# Patient Record
Sex: MALE | Race: OTHER | HISPANIC OR LATINO | URBAN - METROPOLITAN AREA
[De-identification: names, ages, dates, MRNs, and addresses within clinical notes are randomized per-mention and may not be internally consistent; named-entity substitution may affect disease eponyms.]

---

## 2024-02-24 ENCOUNTER — APPOINTMENT (OUTPATIENT)
Dept: RADIOLOGY | Facility: MEDICAL CENTER | Age: 49
DRG: 552 | End: 2024-02-24
Attending: EMERGENCY MEDICINE
Payer: COMMERCIAL

## 2024-02-24 ENCOUNTER — HOSPITAL ENCOUNTER (OUTPATIENT)
Dept: RADIOLOGY | Facility: MEDICAL CENTER | Age: 49
End: 2024-02-24
Attending: EMERGENCY MEDICINE

## 2024-02-24 ENCOUNTER — HOSPITAL ENCOUNTER (INPATIENT)
Facility: MEDICAL CENTER | Age: 49
LOS: 3 days | DRG: 552 | End: 2024-02-27
Attending: EMERGENCY MEDICINE | Admitting: SURGERY
Payer: COMMERCIAL

## 2024-02-24 DIAGNOSIS — S22.081A BURST FRACTURE OF T12 VERTEBRA (HCC): ICD-10-CM

## 2024-02-24 DIAGNOSIS — S22.081A BURST FRACTURE OF TWELFTH THORACIC VERTEBRA (HCC): ICD-10-CM

## 2024-02-24 PROBLEM — Z53.09 CONTRAINDICATION TO DEEP VEIN THROMBOSIS (DVT) PROPHYLAXIS: Status: ACTIVE | Noted: 2024-02-24

## 2024-02-24 PROBLEM — E78.5 DYSLIPIDEMIA: Status: ACTIVE | Noted: 2024-02-24

## 2024-02-24 PROBLEM — T14.90XA TRAUMA: Status: ACTIVE | Noted: 2024-02-24

## 2024-02-24 PROBLEM — I10 PRIMARY HYPERTENSION: Status: ACTIVE | Noted: 2024-02-24

## 2024-02-24 PROBLEM — E11.9 TYPE 2 DIABETES MELLITUS (HCC): Status: ACTIVE | Noted: 2024-02-24

## 2024-02-24 LAB
ABO + RH BLD: NORMAL
ABO GROUP BLD: NORMAL
ALBUMIN SERPL BCP-MCNC: 4.6 G/DL (ref 3.2–4.9)
ALBUMIN/GLOB SERPL: 1.6 G/DL
ALP SERPL-CCNC: 78 U/L (ref 30–99)
ALT SERPL-CCNC: 29 U/L (ref 2–50)
ANION GAP SERPL CALC-SCNC: 15 MMOL/L (ref 7–16)
APTT PPP: 26.9 SEC (ref 24.7–36)
AST SERPL-CCNC: 29 U/L (ref 12–45)
BILIRUB SERPL-MCNC: 0.5 MG/DL (ref 0.1–1.5)
BLD GP AB SCN SERPL QL: NORMAL
BUN SERPL-MCNC: 15 MG/DL (ref 8–22)
CALCIUM ALBUM COR SERPL-MCNC: 9.1 MG/DL (ref 8.5–10.5)
CALCIUM SERPL-MCNC: 9.6 MG/DL (ref 8.5–10.5)
CHLORIDE SERPL-SCNC: 103 MMOL/L (ref 96–112)
CO2 SERPL-SCNC: 22 MMOL/L (ref 20–33)
CREAT SERPL-MCNC: 0.83 MG/DL (ref 0.5–1.4)
ERYTHROCYTE [DISTWIDTH] IN BLOOD BY AUTOMATED COUNT: 38.4 FL (ref 35.9–50)
ETHANOL BLD-MCNC: <10.1 MG/DL
GFR SERPLBLD CREATININE-BSD FMLA CKD-EPI: 108 ML/MIN/1.73 M 2
GLOBULIN SER CALC-MCNC: 2.8 G/DL (ref 1.9–3.5)
GLUCOSE SERPL-MCNC: 160 MG/DL (ref 65–99)
HCT VFR BLD AUTO: 46.3 % (ref 42–52)
HGB BLD-MCNC: 16.1 G/DL (ref 14–18)
INR PPP: 1.05 (ref 0.87–1.13)
MCH RBC QN AUTO: 29 PG (ref 27–33)
MCHC RBC AUTO-ENTMCNC: 34.8 G/DL (ref 32.3–36.5)
MCV RBC AUTO: 83.3 FL (ref 81.4–97.8)
PLATELET # BLD AUTO: 261 K/UL (ref 164–446)
PMV BLD AUTO: 9.8 FL (ref 9–12.9)
POTASSIUM SERPL-SCNC: 3.8 MMOL/L (ref 3.6–5.5)
PROT SERPL-MCNC: 7.4 G/DL (ref 6–8.2)
PROTHROMBIN TIME: 13.8 SEC (ref 12–14.6)
RBC # BLD AUTO: 5.56 M/UL (ref 4.7–6.1)
RH BLD: NORMAL
SODIUM SERPL-SCNC: 140 MMOL/L (ref 135–145)
WBC # BLD AUTO: 20.8 K/UL (ref 4.8–10.8)

## 2024-02-24 PROCEDURE — 85027 COMPLETE CBC AUTOMATED: CPT

## 2024-02-24 PROCEDURE — 36415 COLL VENOUS BLD VENIPUNCTURE: CPT

## 2024-02-24 PROCEDURE — 72128 CT CHEST SPINE W/O DYE: CPT

## 2024-02-24 PROCEDURE — 85730 THROMBOPLASTIN TIME PARTIAL: CPT

## 2024-02-24 PROCEDURE — 86850 RBC ANTIBODY SCREEN: CPT

## 2024-02-24 PROCEDURE — 82077 ASSAY SPEC XCP UR&BREATH IA: CPT

## 2024-02-24 PROCEDURE — 99285 EMERGENCY DEPT VISIT HI MDM: CPT

## 2024-02-24 PROCEDURE — 305948 HCHG GREEN TRAUMA ACT PRE-NOTIFY NO CC

## 2024-02-24 PROCEDURE — 85610 PROTHROMBIN TIME: CPT

## 2024-02-24 PROCEDURE — 770001 HCHG ROOM/CARE - MED/SURG/GYN PRIV*

## 2024-02-24 PROCEDURE — 86900 BLOOD TYPING SEROLOGIC ABO: CPT

## 2024-02-24 PROCEDURE — 99222 1ST HOSP IP/OBS MODERATE 55: CPT | Performed by: SURGERY

## 2024-02-24 PROCEDURE — 80053 COMPREHEN METABOLIC PANEL: CPT

## 2024-02-24 PROCEDURE — 86901 BLOOD TYPING SEROLOGIC RH(D): CPT

## 2024-02-24 RX ORDER — OXYCODONE HYDROCHLORIDE 5 MG/1
5 TABLET ORAL
Status: DISCONTINUED | OUTPATIENT
Start: 2024-02-24 | End: 2024-02-27 | Stop reason: HOSPADM

## 2024-02-24 RX ORDER — AMOXICILLIN 250 MG
1 CAPSULE ORAL
Status: DISCONTINUED | OUTPATIENT
Start: 2024-02-24 | End: 2024-02-27 | Stop reason: HOSPADM

## 2024-02-24 RX ORDER — ACETAMINOPHEN 325 MG/1
650 TABLET ORAL EVERY 6 HOURS
Status: DISCONTINUED | OUTPATIENT
Start: 2024-02-25 | End: 2024-02-27 | Stop reason: HOSPADM

## 2024-02-24 RX ORDER — OXYCODONE HYDROCHLORIDE 10 MG/1
10 TABLET ORAL
Status: DISCONTINUED | OUTPATIENT
Start: 2024-02-24 | End: 2024-02-27 | Stop reason: HOSPADM

## 2024-02-24 RX ORDER — HYDROMORPHONE HYDROCHLORIDE 1 MG/ML
0.5 INJECTION, SOLUTION INTRAMUSCULAR; INTRAVENOUS; SUBCUTANEOUS
Status: DISCONTINUED | OUTPATIENT
Start: 2024-02-24 | End: 2024-02-27 | Stop reason: HOSPADM

## 2024-02-24 RX ORDER — ENEMA 19; 7 G/133ML; G/133ML
1 ENEMA RECTAL
Status: COMPLETED | OUTPATIENT
Start: 2024-02-24 | End: 2024-02-26

## 2024-02-24 RX ORDER — SODIUM CHLORIDE, SODIUM LACTATE, POTASSIUM CHLORIDE, CALCIUM CHLORIDE 600; 310; 30; 20 MG/100ML; MG/100ML; MG/100ML; MG/100ML
INJECTION, SOLUTION INTRAVENOUS CONTINUOUS
Status: DISCONTINUED | OUTPATIENT
Start: 2024-02-25 | End: 2024-02-25

## 2024-02-24 RX ORDER — DOCUSATE SODIUM 100 MG/1
100 CAPSULE, LIQUID FILLED ORAL 2 TIMES DAILY
Status: DISCONTINUED | OUTPATIENT
Start: 2024-02-25 | End: 2024-02-27 | Stop reason: HOSPADM

## 2024-02-24 RX ORDER — METAXALONE 800 MG/1
800 TABLET ORAL 3 TIMES DAILY
Status: DISCONTINUED | OUTPATIENT
Start: 2024-02-25 | End: 2024-02-27 | Stop reason: HOSPADM

## 2024-02-24 RX ORDER — ACETAMINOPHEN 325 MG/1
650 TABLET ORAL EVERY 6 HOURS PRN
Status: DISCONTINUED | OUTPATIENT
Start: 2024-03-01 | End: 2024-02-27 | Stop reason: HOSPADM

## 2024-02-24 RX ORDER — ONDANSETRON 4 MG/1
4 TABLET, ORALLY DISINTEGRATING ORAL EVERY 4 HOURS PRN
Status: DISCONTINUED | OUTPATIENT
Start: 2024-02-24 | End: 2024-02-27 | Stop reason: HOSPADM

## 2024-02-24 RX ORDER — BISACODYL 10 MG
10 SUPPOSITORY, RECTAL RECTAL
Status: DISCONTINUED | OUTPATIENT
Start: 2024-02-24 | End: 2024-02-27 | Stop reason: HOSPADM

## 2024-02-24 RX ORDER — POLYETHYLENE GLYCOL 3350 17 G/17G
1 POWDER, FOR SOLUTION ORAL 2 TIMES DAILY
Status: DISCONTINUED | OUTPATIENT
Start: 2024-02-25 | End: 2024-02-27 | Stop reason: HOSPADM

## 2024-02-24 RX ORDER — LABETALOL HYDROCHLORIDE 5 MG/ML
10 INJECTION, SOLUTION INTRAVENOUS EVERY 4 HOURS PRN
Status: DISCONTINUED | OUTPATIENT
Start: 2024-02-24 | End: 2024-02-27 | Stop reason: HOSPADM

## 2024-02-24 RX ORDER — ONDANSETRON 2 MG/ML
4 INJECTION INTRAMUSCULAR; INTRAVENOUS EVERY 4 HOURS PRN
Status: DISCONTINUED | OUTPATIENT
Start: 2024-02-24 | End: 2024-02-27 | Stop reason: HOSPADM

## 2024-02-24 RX ORDER — AMOXICILLIN 250 MG
1 CAPSULE ORAL NIGHTLY
Status: DISCONTINUED | OUTPATIENT
Start: 2024-02-25 | End: 2024-02-27 | Stop reason: HOSPADM

## 2024-02-24 RX ORDER — GABAPENTIN 100 MG/1
100 CAPSULE ORAL EVERY 8 HOURS
Status: DISCONTINUED | OUTPATIENT
Start: 2024-02-25 | End: 2024-02-27 | Stop reason: HOSPADM

## 2024-02-25 ENCOUNTER — APPOINTMENT (OUTPATIENT)
Dept: RADIOLOGY | Facility: MEDICAL CENTER | Age: 49
DRG: 552 | End: 2024-02-25
Attending: EMERGENCY MEDICINE
Payer: COMMERCIAL

## 2024-02-25 ENCOUNTER — APPOINTMENT (OUTPATIENT)
Dept: RADIOLOGY | Facility: MEDICAL CENTER | Age: 49
DRG: 552 | End: 2024-02-25
Attending: NEUROLOGICAL SURGERY
Payer: COMMERCIAL

## 2024-02-25 ENCOUNTER — APPOINTMENT (OUTPATIENT)
Dept: RADIOLOGY | Facility: MEDICAL CENTER | Age: 49
DRG: 552 | End: 2024-02-25
Attending: SURGERY
Payer: COMMERCIAL

## 2024-02-25 PROBLEM — Z78.9 NO CONTRAINDICATION TO DEEP VEIN THROMBOSIS (DVT) PROPHYLAXIS: Status: ACTIVE | Noted: 2024-02-24

## 2024-02-25 LAB
ANION GAP SERPL CALC-SCNC: 12 MMOL/L (ref 7–16)
BASOPHILS # BLD AUTO: 0.2 % (ref 0–1.8)
BASOPHILS # BLD: 0.02 K/UL (ref 0–0.12)
BUN SERPL-MCNC: 19 MG/DL (ref 8–22)
CALCIUM SERPL-MCNC: 9 MG/DL (ref 8.5–10.5)
CHLORIDE SERPL-SCNC: 103 MMOL/L (ref 96–112)
CO2 SERPL-SCNC: 25 MMOL/L (ref 20–33)
CREAT SERPL-MCNC: 0.92 MG/DL (ref 0.5–1.4)
EOSINOPHIL # BLD AUTO: 0.03 K/UL (ref 0–0.51)
EOSINOPHIL NFR BLD: 0.2 % (ref 0–6.9)
ERYTHROCYTE [DISTWIDTH] IN BLOOD BY AUTOMATED COUNT: 38.9 FL (ref 35.9–50)
GFR SERPLBLD CREATININE-BSD FMLA CKD-EPI: 102 ML/MIN/1.73 M 2
GLUCOSE BLD STRIP.AUTO-MCNC: 133 MG/DL (ref 65–99)
GLUCOSE SERPL-MCNC: 111 MG/DL (ref 65–99)
HCT VFR BLD AUTO: 42.4 % (ref 42–52)
HGB BLD-MCNC: 14.9 G/DL (ref 14–18)
IMM GRANULOCYTES # BLD AUTO: 0.07 K/UL (ref 0–0.11)
IMM GRANULOCYTES NFR BLD AUTO: 0.6 % (ref 0–0.9)
LYMPHOCYTES # BLD AUTO: 1.44 K/UL (ref 1–4.8)
LYMPHOCYTES NFR BLD: 11.4 % (ref 22–41)
MCH RBC QN AUTO: 29.1 PG (ref 27–33)
MCHC RBC AUTO-ENTMCNC: 35.1 G/DL (ref 32.3–36.5)
MCV RBC AUTO: 82.8 FL (ref 81.4–97.8)
MONOCYTES # BLD AUTO: 0.98 K/UL (ref 0–0.85)
MONOCYTES NFR BLD AUTO: 7.7 % (ref 0–13.4)
NEUTROPHILS # BLD AUTO: 10.12 K/UL (ref 1.82–7.42)
NEUTROPHILS NFR BLD: 79.9 % (ref 44–72)
NRBC # BLD AUTO: 0 K/UL
NRBC BLD-RTO: 0 /100 WBC (ref 0–0.2)
PLATELET # BLD AUTO: 227 K/UL (ref 164–446)
PMV BLD AUTO: 9.8 FL (ref 9–12.9)
POTASSIUM SERPL-SCNC: 3.7 MMOL/L (ref 3.6–5.5)
RBC # BLD AUTO: 5.12 M/UL (ref 4.7–6.1)
SODIUM SERPL-SCNC: 140 MMOL/L (ref 135–145)
WBC # BLD AUTO: 12.7 K/UL (ref 4.8–10.8)

## 2024-02-25 PROCEDURE — 85025 COMPLETE CBC W/AUTO DIFF WBC: CPT

## 2024-02-25 PROCEDURE — 97166 OT EVAL MOD COMPLEX 45 MIN: CPT

## 2024-02-25 PROCEDURE — 700105 HCHG RX REV CODE 258: Performed by: SURGERY

## 2024-02-25 PROCEDURE — 99232 SBSQ HOSP IP/OBS MODERATE 35: CPT | Performed by: NURSE PRACTITIONER

## 2024-02-25 PROCEDURE — 97535 SELF CARE MNGMENT TRAINING: CPT

## 2024-02-25 PROCEDURE — 72125 CT NECK SPINE W/O DYE: CPT

## 2024-02-25 PROCEDURE — A9270 NON-COVERED ITEM OR SERVICE: HCPCS | Performed by: SURGERY

## 2024-02-25 PROCEDURE — 700111 HCHG RX REV CODE 636 W/ 250 OVERRIDE (IP): Performed by: NURSE PRACTITIONER

## 2024-02-25 PROCEDURE — 770001 HCHG ROOM/CARE - MED/SURG/GYN PRIV*

## 2024-02-25 PROCEDURE — 72080 X-RAY EXAM THORACOLMB 2/> VW: CPT

## 2024-02-25 PROCEDURE — 80048 BASIC METABOLIC PNL TOTAL CA: CPT

## 2024-02-25 PROCEDURE — 93970 EXTREMITY STUDY: CPT

## 2024-02-25 PROCEDURE — 97162 PT EVAL MOD COMPLEX 30 MIN: CPT

## 2024-02-25 PROCEDURE — 82962 GLUCOSE BLOOD TEST: CPT

## 2024-02-25 PROCEDURE — 36415 COLL VENOUS BLD VENIPUNCTURE: CPT

## 2024-02-25 PROCEDURE — 700102 HCHG RX REV CODE 250 W/ 637 OVERRIDE(OP): Performed by: SURGERY

## 2024-02-25 PROCEDURE — 72131 CT LUMBAR SPINE W/O DYE: CPT

## 2024-02-25 RX ORDER — ENOXAPARIN SODIUM 100 MG/ML
30 INJECTION SUBCUTANEOUS EVERY 12 HOURS
Status: DISCONTINUED | OUTPATIENT
Start: 2024-02-25 | End: 2024-02-27 | Stop reason: HOSPADM

## 2024-02-25 RX ADMIN — ACETAMINOPHEN 650 MG: 325 TABLET, FILM COATED ORAL at 11:53

## 2024-02-25 RX ADMIN — GABAPENTIN 100 MG: 100 CAPSULE ORAL at 00:23

## 2024-02-25 RX ADMIN — SODIUM CHLORIDE, POTASSIUM CHLORIDE, SODIUM LACTATE AND CALCIUM CHLORIDE: 600; 310; 30; 20 INJECTION, SOLUTION INTRAVENOUS at 00:22

## 2024-02-25 RX ADMIN — DOCUSATE SODIUM 100 MG: 100 CAPSULE, LIQUID FILLED ORAL at 18:21

## 2024-02-25 RX ADMIN — GABAPENTIN 100 MG: 100 CAPSULE ORAL at 20:14

## 2024-02-25 RX ADMIN — SODIUM CHLORIDE, POTASSIUM CHLORIDE, SODIUM LACTATE AND CALCIUM CHLORIDE: 600; 310; 30; 20 INJECTION, SOLUTION INTRAVENOUS at 10:54

## 2024-02-25 RX ADMIN — GABAPENTIN 100 MG: 100 CAPSULE ORAL at 14:44

## 2024-02-25 RX ADMIN — METAXALONE 800 MG: 800 TABLET ORAL at 11:53

## 2024-02-25 RX ADMIN — METAXALONE 800 MG: 800 TABLET ORAL at 05:04

## 2024-02-25 RX ADMIN — ACETAMINOPHEN 650 MG: 325 TABLET, FILM COATED ORAL at 18:21

## 2024-02-25 RX ADMIN — METAXALONE 800 MG: 800 TABLET ORAL at 18:21

## 2024-02-25 RX ADMIN — GABAPENTIN 100 MG: 100 CAPSULE ORAL at 05:04

## 2024-02-25 RX ADMIN — DOCUSATE SODIUM 100 MG: 100 CAPSULE, LIQUID FILLED ORAL at 05:04

## 2024-02-25 RX ADMIN — ENOXAPARIN SODIUM 30 MG: 100 INJECTION SUBCUTANEOUS at 11:54

## 2024-02-25 RX ADMIN — DOCUSATE SODIUM 50 MG AND SENNOSIDES 8.6 MG 1 TABLET: 8.6; 5 TABLET, FILM COATED ORAL at 20:14

## 2024-02-25 RX ADMIN — ACETAMINOPHEN 650 MG: 325 TABLET, FILM COATED ORAL at 00:23

## 2024-02-25 RX ADMIN — OXYCODONE HYDROCHLORIDE 10 MG: 10 TABLET ORAL at 01:45

## 2024-02-25 RX ADMIN — ACETAMINOPHEN 650 MG: 325 TABLET, FILM COATED ORAL at 05:03

## 2024-02-25 ASSESSMENT — LIFESTYLE VARIABLES
TOTAL SCORE: 0
EVER FELT BAD OR GUILTY ABOUT YOUR DRINKING: NO
EVER HAD A DRINK FIRST THING IN THE MORNING TO STEADY YOUR NERVES TO GET RID OF A HANGOVER: NO
TOTAL SCORE: 0
EVER FELT BAD OR GUILTY ABOUT YOUR DRINKING: NO
HAVE YOU EVER FELT YOU SHOULD CUT DOWN ON YOUR DRINKING: NO
HAVE PEOPLE ANNOYED YOU BY CRITICIZING YOUR DRINKING: NO
AVERAGE NUMBER OF DAYS PER WEEK YOU HAVE A DRINK CONTAINING ALCOHOL: 2
HOW MANY TIMES IN THE PAST YEAR HAVE YOU HAD 5 OR MORE DRINKS IN A DAY: 1
ON A TYPICAL DAY WHEN YOU DRINK ALCOHOL HOW MANY DRINKS DO YOU HAVE: 0
TOTAL SCORE: 0
HAVE YOU EVER FELT YOU SHOULD CUT DOWN ON YOUR DRINKING: NO
HOW MANY TIMES IN THE PAST YEAR HAVE YOU HAD 5 OR MORE DRINKS IN A DAY: 0
ALCOHOL_USE: NO
EVER HAD A DRINK FIRST THING IN THE MORNING TO STEADY YOUR NERVES TO GET RID OF A HANGOVER: NO
ON A TYPICAL DAY WHEN YOU DRINK ALCOHOL HOW MANY DRINKS DO YOU HAVE: 0
ALCOHOL_USE: YES
TOTAL SCORE: 0
AVERAGE NUMBER OF DAYS PER WEEK YOU HAVE A DRINK CONTAINING ALCOHOL: 0
TOTAL SCORE: 0
HAVE PEOPLE ANNOYED YOU BY CRITICIZING YOUR DRINKING: NO
CONSUMPTION TOTAL: POSITIVE
TOTAL SCORE: 0
CONSUMPTION TOTAL: NEGATIVE

## 2024-02-25 ASSESSMENT — ACTIVITIES OF DAILY LIVING (ADL): TOILETING: INDEPENDENT

## 2024-02-25 ASSESSMENT — PAIN DESCRIPTION - PAIN TYPE
TYPE: ACUTE PAIN

## 2024-02-25 ASSESSMENT — COGNITIVE AND FUNCTIONAL STATUS - GENERAL
MOVING TO AND FROM BED TO CHAIR: A LOT
PERSONAL GROOMING: A LITTLE
DRESSING REGULAR UPPER BODY CLOTHING: A LOT
CLIMB 3 TO 5 STEPS WITH RAILING: A LOT
DRESSING REGULAR LOWER BODY CLOTHING: A LITTLE
MOVING TO AND FROM BED TO CHAIR: A LITTLE
DRESSING REGULAR LOWER BODY CLOTHING: A LOT
TURNING FROM BACK TO SIDE WHILE IN FLAT BAD: A LOT
WALKING IN HOSPITAL ROOM: A LOT
MOVING FROM LYING ON BACK TO SITTING ON SIDE OF FLAT BED: A LITTLE
HELP NEEDED FOR BATHING: A LITTLE
STANDING UP FROM CHAIR USING ARMS: A LOT
SUGGESTED CMS G CODE MODIFIER DAILY ACTIVITY: CK
SUGGESTED CMS G CODE MODIFIER MOBILITY: CK
MOBILITY SCORE: 16
TURNING FROM BACK TO SIDE WHILE IN FLAT BAD: A LITTLE
TOILETING: A LITTLE
DRESSING REGULAR UPPER BODY CLOTHING: A LITTLE
WALKING IN HOSPITAL ROOM: A LITTLE
TOILETING: A LITTLE
DAILY ACTIVITIY SCORE: 18
CLIMB 3 TO 5 STEPS WITH RAILING: A LOT
STANDING UP FROM CHAIR USING ARMS: A LITTLE
SUGGESTED CMS G CODE MODIFIER DAILY ACTIVITY: CK
MOVING FROM LYING ON BACK TO SITTING ON SIDE OF FLAT BED: A LOT
HELP NEEDED FOR BATHING: A LITTLE
EATING MEALS: A LITTLE
SUGGESTED CMS G CODE MODIFIER MOBILITY: CL
MOBILITY SCORE: 13
DAILY ACTIVITIY SCORE: 18

## 2024-02-25 ASSESSMENT — PATIENT HEALTH QUESTIONNAIRE - PHQ9
1. LITTLE INTEREST OR PLEASURE IN DOING THINGS: NOT AT ALL
SUM OF ALL RESPONSES TO PHQ9 QUESTIONS 1 AND 2: 0
2. FEELING DOWN, DEPRESSED, IRRITABLE, OR HOPELESS: NOT AT ALL

## 2024-02-25 ASSESSMENT — COPD QUESTIONNAIRES
HAVE YOU SMOKED AT LEAST 100 CIGARETTES IN YOUR ENTIRE LIFE: NO/DON'T KNOW
COPD SCREENING SCORE: 0
DO YOU EVER COUGH UP ANY MUCUS OR PHLEGM?: NO/ONLY WITH OCCASIONAL COLDS OR INFECTIONS
DURING THE PAST 4 WEEKS HOW MUCH DID YOU FEEL SHORT OF BREATH: NONE/LITTLE OF THE TIME

## 2024-02-25 ASSESSMENT — ENCOUNTER SYMPTOMS
NEUROLOGICAL NEGATIVE: 1
CONSTITUTIONAL NEGATIVE: 1
GASTROINTESTINAL NEGATIVE: 1
BACK PAIN: 1

## 2024-02-25 ASSESSMENT — GAIT ASSESSMENTS
ASSISTIVE DEVICE: FRONT WHEEL WALKER
DISTANCE (FEET): 150
DEVIATION: BRADYKINETIC
GAIT LEVEL OF ASSIST: CONTACT GUARD ASSIST

## 2024-02-25 NOTE — PROGRESS NOTES
TLSO fit to patient for post-discharge use. Patient instructed on proper use including donning, doffing, and adjustment after discharge if needed. Patient had no pertinent questions at this time regarding this brace.    Contact traction with any questions or concerns regarding the use of this brace.

## 2024-02-25 NOTE — THERAPY
Occupational Therapy   Initial Evaluation     Patient Name: Michael Singh  Age:  48 y.o., Sex:  male  Medical Record #: [id number]  Today's Date: 2/25/2024     Precautions: Fall Risk, TLSO (Thoracolumbosacral orthosis), Spinal / Back Precautions   Comments: TLSO when OOB    Assessment  Patient is 48 y.o. male admitted after a sledding accident where he sustained  T12 burst fx with mild kyphosis. Per neuro, pt is currently pending further imaging to determine if there is a need for surgical intervention. Pt cleared by neuro to complete OT eval and OOB ADLs with TLSO. Pt seen for OT eval and tx. Pt currently lives alone iin a 1-story house in [place], [place]. Pt was independent with ADLs and IADLs.    During OT eval, pt demo ability to complete most ADLs, functional mobility, and txfs with SBA-mod A and FWW. Pt provided increased assist to complete LB dressing due difficulty with tailor sitting; politely declined training during session in use of AE to improve independence. Pt reported dizziness during session, BP was 120/71, and was unable to tolerate upright seated position. Assisted pt with returning to supine where symptoms had begun to resolve. Pt provided education on importance of OOB ADLs and encouraged him to sit up for meals with nursing staff to reduce risk of deconditioning.  Pt provided additional education on spinal precautions, brace management, home safety, energy conservation, and compensatory strategies to safely complete ADLs. Pt is currently limited by pain, dizziness, and decreased activity tolerance. Currently recommend home health for further OT services following DC. Will continue to follow for ongoing acute OT services.     Plan    Occupational Therapy Initial Treatment Plan   Treatment Interventions: Self Care / Activities of Daily Living, Adaptive Equipment, Therapeutic Exercises, Therapeutic Activity  Treatment Frequency: 5 Times per Week  Duration: Until Therapy Goals Met    DC Equipment  Recommendations: Tub Transfer Bench  Discharge Recommendations: Recommend home health for continued occupational therapy services      Objective      Prior Living Situation   Prior Services Home-Independent   Housing / Facility 1 Story House   Steps Into Home 1   Steps In Home 1   Bathroom Set up Bathtub / Shower Combination   Equipment Owned None   Lives with - Patient's Self Care Capacity Alone and Able to Care For Self   Comments Pt reports that he currently resides alone and reports having a limited social support network. Pt states that his 12 y.o. daughter splits her time between his house and her mom's house (switch on a weekly basis)   Prior Level of ADL Function   Self Feeding Independent   Grooming / Hygiene Independent   Bathing Independent   Dressing Independent   Toileting Independent   Prior Level of IADL Function   Medication Management Independent   Laundry Independent   Kitchen Mobility Independent   Finances Independent   Home Management Independent   Shopping Independent   Prior Level Of Mobility Independent Without Device in Community;Independent Without Device in Home   Driving / Transportation Driving Independent   Occupation (Pre-Hospital Vocational) Other (Comments)  (Pt is a self-employed )   Precautions   Precautions Fall Risk;TLSO (Thoracolumbosacral orthosis);Spinal / Back Precautions    Comments TLSO when OOB   Vitals   Patient BP Position Sitting   Blood Pressure 120/71   O2 Delivery Device None - Room Air   Vitals Comments Pt reported increased dizziness with OOB ADLs. Pt attempted to sit up in chair, but unable to tolerate upright position prompting return to supine. Pt reported that symptoms had begun to resovle with time.   Pain 0 - 10 Group   Therapist Pain Assessment 7;Nurse Notified  (Agreeable to activity, but reported an increase in pain with movement)   Cognition    Cognition / Consciousness WDL   Level of Consciousness Alert   Comments Pleasant, cooperative, and  receptive to education   Active ROM Upper Body   Active ROM Upper Body  WDL   Dominant Hand Right   Comments Observed during functional tasks   Strength Upper Body   Upper Body Strength  X   Gross Strength Generalized Weakness, Equal Bilaterally.    Balance Assessment   Sitting Balance (Static) Fair +   Sitting Balance (Dynamic) Fair   Standing Balance (Static) Fair   Standing Balance (Dynamic) Fair   Weight Shift Sitting Fair   Weight Shift Standing Fair   Comments w/FWW   Bed Mobility    Supine to Sit Standby Assist   Sit to Supine Standby Assist   Scooting Standby Assist   Rolling Standby Assist   Comments HOB flat, use of rails; log roll   ADL Assessment   Eating Modified Independent   Grooming Standby Assist;Standing  (hand washing at sink)   Upper Body Dressing Moderate Assist  (Don/Doff TLSO)   Lower Body Dressing Maximal Assist  (Socks; politely declined training during session on AE)   Toileting Standby Assist  (Voided at standard toilet while in standing. Able to complete seated toilet txf)   6 Clicks Daily Activity Score 18   Functional Mobility   Sit to Stand Standby Assist   Bed, Chair, Wheelchair Transfer Standby Assist   Toilet Transfers Contact Guard Assist   Mobility Functional mobility in room w/FWW   Activity Tolerance   Sitting in Chair 4 min   Sitting Edge of Bed 6 min   Standing 9 min   Comments Limited by pain and dizziness   Patient / Family Goals   Patient / Family Goal #1 To go home   Short Term Goals   Short Term Goal # 1 Pt will complete LB dressing with supv using AE PRN   Short Term Goal # 2 Pt will don/doff TLSO with supv   Short Term Goal # 3 Pt will complete ADL txfs with supv   Education Group   Education Provided Spinal Precautions;Energy Conservation;Brace Wear and Care;Home Safety;Activities of Daily Living;Role of Occupational Therapist;Pathology of bedrest   Role of Occupational Therapist Patient Response Patient;Acceptance;Explanation;Verbal Demonstration   Spinal Precautions  Patient Response Patient;Acceptance;Explanation;Demonstration;Handout;Verbal Demonstration;Action Demonstration   Energy Conservation Patient Response Patient;Acceptance;Explanation;Verbal Demonstration  (Pacing activities, gradually increasing intensity, and taking frequent rest breaks)   Brace Wear & Care Patient Response Patient;Acceptance;Explanation;Demonstration;Teach Back;Reinforcement Needed  (Training on how to don/doff TLSO and wear guidelines set forth by physician)   Home Safety Patient Response Patient;Acceptance;Explanation;Verbal Demonstration  (Recommend shower chair and having multiple points of contact when stepping in/out of tub to reduce risk of falls)   ADL Patient Response Patient;Acceptance;Explanation;Reinforcement Needed  (Compensatory strategies to safely complete ADLs while adhering to spinal precautions)   Pathology of Bedrest Patient Response Patient;Acceptance;Explanation;Reinforcement Needed  (Importance of OOB ADLs to reduce risk of deconditioning and assist with healing udring recovery. Encouraged pt to sit up in chair or at EOB for meals while in house)

## 2024-02-25 NOTE — CONSULTS
Surgery Neurosurgery Consultation    Date of Service  2/25/2024    Referring Physician  Scar Ventura M.D.    Consulting Physician  Henrique Wick M.D.    Reason for Consultation  T12 burst fracture    History of Presenting Illness  48 y.o. male who presented 2/24/2024 with T12 burst fracture with mild kyphosis after a sledding accident  Patient denies neurologic symptoms, endorses back pain at the site of injury    Review of Systems  Review of Systems   Musculoskeletal:  Positive for back pain.   All other systems reviewed and are negative.      Past Medical History   has no past medical history on file.    Surgical History   has no past surgical history on file.    Family History  family history is not on file.    Social History   reports that he has never smoked. He has never been exposed to tobacco smoke. He has never used smokeless tobacco.    Medications  None       Allergies  No Known Allergies    Physical Exam  Temp:  [35.8 °C (96.5 °F)-36.5 °C (97.7 °F)] 36.5 °C (97.7 °F)  Pulse:  [78-88] 88  Resp:  [16-18] 16  BP: (113-138)/(64-80) 113/71  SpO2:  [90 %-98 %] 93 %    Physical Exam  Constitutional:       Appearance: Normal appearance. He is normal weight.   HENT:      Head: Normocephalic and atraumatic.   Neurological:      General: No focal deficit present.      Mental Status: He is alert and oriented to person, place, and time. Mental status is at baseline.      Sensory: No sensory deficit.      Motor: No weakness.   Psychiatric:         Mood and Affect: Mood normal.         Behavior: Behavior normal.         Thought Content: Thought content normal.         Judgment: Judgment normal.         Fluids      Laboratory  Recent Labs     02/24/24 2029   WBC 20.8*   RBC 5.56   HEMOGLOBIN 16.1   HEMATOCRIT 46.3   MCV 83.3   MCH 29.0   MCHC 34.8   RDW 38.4   PLATELETCT 261   MPV 9.8     Recent Labs     02/24/24 2029   SODIUM 140   POTASSIUM 3.8   CHLORIDE 103   CO2 22   GLUCOSE 160*   BUN 15   CREATININE  0.83   CALCIUM 9.6     Recent Labs     02/24/24 2029   APTT 26.9   INR 1.05                 Imaging  CT-LSPINE W/O PLUS RECONS   Final Result      1.  No acute traumatic injury of the lumbar spine.   2.  T12 fracture as described elsewhere.   3.  Schmorl's nodes and vacuum disc phenomenon at L3-L4.      CT-CSPINE WITHOUT PLUS RECONS   Final Result      1.  No acute traumatic injury of the cervical spine.   2.  Degenerative changes at C4-C5 and C5-C6.      CT-TSPINE W/O PLUS RECONS   Final Result      1.  T12 burst fracture with moderate loss of height and 30% canal stenosis, as seen on prior outside CT lumbar spine.   2.  No segmental malalignment.   3.  Mild multilevel degenerative change of thoracic spine.      US-TRAUMA VEIN SCREEN LOWER BILAT EXTREMITY    (Results Pending)       Assessment/Plan  TLSO when out of bed.  TLSO is at bedside  Work on PT/OT  Will need upright x-rays in TLSO to assess for weightbearing stability  ORIF if unable to mobilize or evidence of instability with weightbearing  Okay for DVT PPx now  Will continue to follow

## 2024-02-25 NOTE — PROGRESS NOTES
4 Eyes Skin Assessment Completed by BRANDON DUMONT and BRANDON Grover.    Head WDL  Ears WDL  Nose WDL  Mouth WDL  Neck WDL  Breast/Chest WDL  Shoulder Blades WDL  Spine WDL  (R) Arm/Elbow/Hand WDL  (L) Arm/Elbow/Hand WDL  Abdomen WDL  Groin WDL  Scrotum/Coccyx/Buttocks Redness and Blanching  (R) Leg WDL  (L) Leg Incision  (R) Heel/Foot/Toe Redness and Blanching  (L) Heel/Foot/Toe Redness and Blanching          Devices In Places Blood Pressure Cuff, Pulse Ox, and SCD's, PIV x 1      Interventions In Place Pillows and Heels Loaded W/Pillows    Possible Skin Injury No    Pictures Uploaded Into Epic N/A  Wound Consult Placed N/A  RN Wound Prevention Protocol Ordered No

## 2024-02-25 NOTE — ASSESSMENT & PLAN NOTE
T12 burst fracture with 5 mm retropulsion without central canal stenosis.  Non-operative management.  TLSO when out of bed.  2/25 Upright films with stable loss of height  Henrique Wick MD. Neurosurgeon. [facility].

## 2024-02-25 NOTE — PROGRESS NOTES
Neurosurgery Progress Note    Subjective:  48 y.o. male who presented 2024 with T12 burst fracture with mild kyphosis after a sledding accident  Patient denies neurologic symptoms, endorses back pain at the site of injury    Exam:  Pleasant and cooperative  A&O x 4  B/L LE strength and sensation full and intact throughout      BP  Min: 113/71  Max: 138/70  Pulse  Av.2  Min: 78  Max: 88  Resp  Av.9  Min: 16  Max: 18  Temp  Av.5 °C (97.7 °F)  Min: 35.8 °C (96.5 °F)  Max: 37.3 °C (99.1 °F)  SpO2  Av.3 %  Min: 90 %  Max: 98 %    No data recorded    Recent Labs     24   WBC 20.8*   RBC 5.56   HEMOGLOBIN 16.1   HEMATOCRIT 46.3   MCV 83.3   MCH 29.0   MCHC 34.8   RDW 38.4   PLATELETCT 261   MPV 9.8     Recent Labs     24   SODIUM 140   POTASSIUM 3.8   CHLORIDE 103   CO2 22   GLUCOSE 160*   BUN 15   CREATININE 0.83   CALCIUM 9.6     Recent Labs     24   APTT 26.9   INR 1.05           Intake/Output                         24 - 24 0659 24 - 24 0659      Total  Total                 Intake    I.V.  --  0 0  --  -- --    Pre-Hospital Volume -- 0 0 -- -- --    Trauma Resuscitation Volume -- 0 0 -- -- --    Blood  --  0 0  --  -- --    PRBC Total Volume (Non-Barcoded) -- 0 0 -- -- --    FFP Total Volume (Non-Barcoded) -- 0 0 -- -- --    Platelets Total Volume (Non-Barcoded) -- 0 0 -- -- --    Cryoprecipitate (Pooled) Total Volume (Non-Barcoded) -- 0 0 -- -- --    Total Intake -- 0 0 -- -- --       Output    Other  --  0 0  --  -- --    Pre-Hospital Output -- 0 0 -- -- --    Trauma Resuscitation Output -- 0 0 -- -- --    Blood  --  0 0  --  -- --    Est. Blood Loss -- 0 0 -- -- --    Total Output -- 0 0 -- -- --       Net I/O     -- 0 0 -- -- --              Intake/Output Summary (Last 24 hours) at 2024 1008  Last data filed at 2024  Gross per 24 hour   Intake 0 ml   Output 0 ml   Net  0 ml             Respiratory Therapy Consult   Continuous RT    Pharmacy Consult Request  1 Each PHARMACY TO DOSE    ondansetron  4 mg Q4HRS PRN    ondansetron  4 mg Q4HRS PRN    docusate sodium  100 mg BID    senna-docusate  1 Tablet Nightly    senna-docusate  1 Tablet Q24HRS PRN    polyethylene glycol/lytes  1 Packet BID    magnesium hydroxide  30 mL DAILY    bisacodyl  10 mg Q24HRS PRN    sodium phosphate  1 Each Once PRN    LR   Continuous    acetaminophen  650 mg Q6HRS    Followed by    [START ON 3/1/2024] acetaminophen  650 mg Q6HRS PRN    oxyCODONE immediate-release  5 mg Q3HRS PRN    Or    oxyCODONE immediate-release  10 mg Q3HRS PRN    Or    HYDROmorphone  0.5 mg Q3HRS PRN    gabapentin  100 mg Q8HRS    metaxalone  800 mg TID    labetalol  10 mg Q4HRS PRN       Assessment and Plan:  Hospital day # 2  TLSO when OOB  Pain control  Will need upright thoracolumbar x-rays when able to ambulate, Monday or Tuesday  NS following    Chemical prophylactic DVT therapy: Yes - Lovenox 40mg/qd    Start date/time: 2/25

## 2024-02-25 NOTE — ED NOTES
Med Rec complete per patient  Allergies reviewed with patient  Patient denies taking any RX or OTC meds in the last 30 days    Gayla Christensen CPhT

## 2024-02-25 NOTE — PROGRESS NOTES
Bedside report received.  Assessment complete.  A&O x 4. Patient calls appropriately.  Patient ambulates with x1 assist.  Patient has 3/10 pain. Pain managed with prescribed medications.  Patient complains of some nausea without vomiting. Patient is NPO at this time.  + void, - flatus, - BM.  SCD's on.  Review plan with of care with patient. Call light and personal belongings within reach. Hourly rounding in place. All needs met at this time.

## 2024-02-25 NOTE — ED NOTES
"Patient BIB [id number] as trauma green transfer from [facility]. 48 y.o. male involved in a sledding accident  when he hit a bump and felt a \"crack\" in his back. - Helmet, - LOC, and - Thinners. Neuro intact, CMS intact.    Per outlying facility patient has a T12 burst fracture.    Patient arrives w/ *no spinal immobilizations* in place.   Chief complaint of pain to back.  Medications administered en route: dilaudid at outlying facility and 4mg zofran    Per Patient: No history, meds, or allergies  "

## 2024-02-25 NOTE — PROGRESS NOTES
T12 burst fracture sledding tx from [facility]  Neuro intact by report  Will order TLSO when OOB  Will need admission for pain control  ORIF if unable to mobilize in the coming days  Full note to follow in AM    Consulted at 2250, evaluated 2252

## 2024-02-25 NOTE — ED PROVIDER NOTES
"ED Provider Note    CHIEF COMPLAINT  Chief Complaint   Patient presents with    Trauma Green     Pt was involved in a sledding accident. Pt went hit a bump and per pt, \"felt a crack.\"  - helmet  - LOC  - thinners       EXTERNAL RECORDS REVIEWED  Outpatient Notes outpatient notes from previous facility    HPI/ROS  LIMITATION TO HISTORY   Select: : None  OUTSIDE HISTORIAN(S):  EMS.  Patient has T12 burst fracture.    Michael Singh is a 48 y.o. male who presents here for evaluation of back pain.  Patient was sliding down a hill, when he took a bump, and landed.  He is noted to have a T12 burst fracture, without focal neurodeficits.  Patient was transferred here for higher level of care and neurosurgery evaluation.  Patient did not strike his head, has no headache, neck pain, chest pain, shortness of breath.  He has no abdominal pain.    PAST MEDICAL HISTORY   No bleeding disorders    SURGICAL HISTORY  patient denies any surgical history    FAMILY HISTORY  No family history on file.    SOCIAL HISTORY  Social History     Tobacco Use    Smoking status: Not on file    Smokeless tobacco: Not on file   Substance and Sexual Activity    Alcohol use: Not on file    Drug use: Not on file    Sexual activity: Not on file       CURRENT MEDICATIONS  Home Medications    **Home medications have not yet been reviewed for this encounter**         ALLERGIES  No Known Allergies    PHYSICAL EXAM  VITAL SIGNS: /70   Pulse 83   Temp 35.8 °C (96.5 °F) (Temporal)   Resp 18   Ht 1.727 m (5' 8\")   Wt 81.6 kg (180 lb)   SpO2 98% Comment: 2L NC  BMI 27.37 kg/m²    Constitutional: Well developed, well nourished.  Mild acute distress.  HEENT: Normocephalic, atraumatic. Posterior pharynx clear and moist.  Eyes:  EOMI. Normal sclera.  Neck: Supple, Full range of motion, nontender.  Chest/Pulmonary: clear to ausculation. Symmetrical expansion.   Cardio: Regular rate and rhythm with no murmur.   Abdomen: Soft, nontender. No peritoneal signs. " No guarding. No palpable masses.  Back: T11, T12 tenderness midline, no step offs.  Musculoskeletal: No deformity, no edema, neurovascular intact.   Neuro: Clear speech, appropriate, cooperative, cranial nerves II-XII grossly intact.  Of all extremities x 4  Psych: Normal mood and affect      DIAGNOSTIC STUDIES / PROCEDURES  Results for orders placed or performed during the hospital encounter of 02/24/24   DIAGNOSTIC ALCOHOL   Result Value Ref Range    Diagnostic Alcohol <10.1 <10.1 mg/dL   CBC WITHOUT DIFFERENTIAL   Result Value Ref Range    WBC 20.8 (H) 4.8 - 10.8 K/uL    RBC 5.56 4.70 - 6.10 M/uL    Hemoglobin 16.1 14.0 - 18.0 g/dL    Hematocrit 46.3 42.0 - 52.0 %    MCV 83.3 81.4 - 97.8 fL    MCH 29.0 27.0 - 33.0 pg    MCHC 34.8 32.3 - 36.5 g/dL    RDW 38.4 35.9 - 50.0 fL    Platelet Count 261 164 - 446 K/uL    MPV 9.8 9.0 - 12.9 fL   Comp Metabolic Panel   Result Value Ref Range    Sodium 140 135 - 145 mmol/L    Potassium 3.8 3.6 - 5.5 mmol/L    Chloride 103 96 - 112 mmol/L    Co2 22 20 - 33 mmol/L    Anion Gap 15.0 7.0 - 16.0    Glucose 160 (H) 65 - 99 mg/dL    Bun 15 8 - 22 mg/dL    Creatinine 0.83 0.50 - 1.40 mg/dL    Calcium 9.6 8.5 - 10.5 mg/dL    Correct Calcium 9.1 8.5 - 10.5 mg/dL    AST(SGOT) 29 12 - 45 U/L    ALT(SGPT) 29 2 - 50 U/L    Alkaline Phosphatase 78 30 - 99 U/L    Total Bilirubin 0.5 0.1 - 1.5 mg/dL    Albumin 4.6 3.2 - 4.9 g/dL    Total Protein 7.4 6.0 - 8.2 g/dL    Globulin 2.8 1.9 - 3.5 g/dL    A-G Ratio 1.6 g/dL   Prothrombin Time   Result Value Ref Range    PT 13.8 12.0 - 14.6 sec    INR 1.05 0.87 - 1.13   APTT   Result Value Ref Range    APTT 26.9 24.7 - 36.0 sec   COD - Adult (Type and Screen)   Result Value Ref Range    ABO Grouping Only O     Rh Grouping Only POS     Antibody Screen-Cod NEG    ABO Rh Confirm   Result Value Ref Range    ABO Rh Confirm O POS    ESTIMATED GFR   Result Value Ref Range    GFR (CKD-EPI) 108 >60 mL/min/1.73 m 2         RADIOLOGY  I have independently  interpreted the diagnostic imaging associated with this visit and am waiting the final reading from the radiologist.   My preliminary interpretation is as follows: See below  Radiologist interpretation:   CT-TSPINE W/O PLUS RECONS   Final Result      1.  T12 burst fracture with moderate loss of height and 30% canal stenosis, as seen on prior outside CT lumbar spine.   2.  No segmental malalignment.   3.  Mild multilevel degenerative change of thoracic spine.            COURSE & MEDICAL DECISION MAKING    Pt will be admitted to trauma services.     CRITICAL CARE  The very real possibility of a deterioration of this patient's condition required the highest level of my preparedness for sudden, emergent intervention.  I provided critical care services, which included medication orders, frequent reevaluations of the patient's condition and response to treatment, ordering and reviewing test results, and discussing the case with various consultants.  The critical care time associated with the care of the patient was 45 minutes. Review chart for interventions. This time is exclusive of any other billable procedures.       INITIAL ASSESSMENT, COURSE AND PLAN  Care Narrative: This is a 48-year-old male here for evaluation of back pain.  Patient was transferred here after is noted that he had a T12 burst fracture on CT scan.  He has no focal deficits.  Patient will be placed in a TLSO brace, admitted to trauma services, with neurosurgery consult in the morning.  11:14 PM  Dr. Ventura would like c and L spine ct scans.     DISPOSITION AND DISCUSSIONS  I have discussed management of the patient with the following physicians and HANNAH's:  spine / trauma       FINAL DIAGNOSIS  Thoracic burst fracture   Critical care time 45 minutes.        Electronically signed by: Abner Lockett D.O., 2/24/2024 10:29 PM

## 2024-02-25 NOTE — CARE PLAN
Problem: Pain - Standard  Goal: Alleviation of pain or a reduction in pain to the patient’s comfort goal  Description: Target End Date:  Prior to discharge or change in level of care    Document on Vitals flowsheet    1.  Document pain using the appropriate pain scale per order or unit policy  2.  Educate and implement non-pharmacologic comfort measures (i.e. relaxation, distraction, massage, cold/heat therapy, etc.)  3.  Pain management medications as ordered  4.  Reassess pain after pain med administration per policy  5.  If opiods administered assess patient's response to pain medication is appropriate per POSS sedation scale  6.  Follow pain management plan developed in collaboration with patient and interdisciplinary team (including palliative care or pain specialists if applicable)  Outcome: Progressing   The patient is Stable - Low risk of patient condition declining or worsening    Shift Goals  Clinical Goals: pain control, rest, IV hydration  Patient Goals: pain control, rest    Progress made toward(s) clinical / shift goals:  pt will verbalize pain score of 3/10 or below 30 mins to 1 hour after prn meds given during shift, call  bell within reach-  instructions given & verbalized understanding.

## 2024-02-25 NOTE — ASSESSMENT & PLAN NOTE
VTE prophylaxis initially contraindicated secondary to elevated bleeding risk.  2/25 Trauma surveillance venous duplex ultrasonography ordered.  2/25 Prophylactic dose enoxaparin 40 mg BID initiated.

## 2024-02-25 NOTE — ASSESSMENT & PLAN NOTE
Sledding accident.  Trauma Green Transfer Activation from [facility] [place], [place].  Scar Ventura MD, Trauma Surgery

## 2024-02-25 NOTE — PROGRESS NOTES
Trauma / Surgical Daily Progress Note    Date of Service  2/25/2024    Chief Complaint  48 y.o. male admitted 2/24/2024 with L1 fracture    Interval Events  Patient mated overnight after L1 burst fracture while sliding.  Per hospital chart Mark Jaramillo has many medications listed for patient but patient adamantly denies that he has diabetes or takes any medications on a daily basis.  Patient can get out of bed with TLSO   Neurosurgery has cleared for Lovenox  No surgeries planned, okay for diet  Therapy evaluation with TLSO  Neurosurgery plans to order plain films tomorrow  Bowel protocol    Review of Systems  Review of Systems   Constitutional: Negative.    HENT: Negative.     Gastrointestinal: Negative.    Genitourinary: Negative.    Musculoskeletal:  Positive for back pain.   Neurological: Negative.         Vital Signs  Temp:  [35.8 °C (96.5 °F)-37.3 °C (99.1 °F)] 37.3 °C (99.1 °F)  Pulse:  [78-88] 88  Resp:  [16-18] 16  BP: (113-138)/(64-80) 121/75  SpO2:  [90 %-98 %] 94 %    Physical Exam  Physical Exam  Vitals and nursing note reviewed.   Constitutional:       Appearance: Normal appearance. He is normal weight.   HENT:      Head: Normocephalic and atraumatic.      Right Ear: External ear normal.      Left Ear: External ear normal.      Mouth/Throat:      Pharynx: Oropharynx is clear.   Eyes:      Conjunctiva/sclera: Conjunctivae normal.   Cardiovascular:      Rate and Rhythm: Normal rate.   Pulmonary:      Effort: Pulmonary effort is normal.   Abdominal:      General: There is no distension.      Palpations: Abdomen is soft.      Tenderness: There is no abdominal tenderness.   Musculoskeletal:         General: Normal range of motion.   Skin:     General: Skin is warm and dry.      Findings: No bruising.   Neurological:      General: No focal deficit present.      Mental Status: He is alert and oriented to person, place, and time. Mental status is at baseline.      Sensory: No sensory deficit.      Motor: No  weakness.   Psychiatric:         Mood and Affect: Mood normal.         Behavior: Behavior normal.         Laboratory  Recent Results (from the past 24 hour(s))   DIAGNOSTIC ALCOHOL    Collection Time: 02/24/24  8:29 PM   Result Value Ref Range    Diagnostic Alcohol <10.1 <10.1 mg/dL   CBC WITHOUT DIFFERENTIAL    Collection Time: 02/24/24  8:29 PM   Result Value Ref Range    WBC 20.8 (H) 4.8 - 10.8 K/uL    RBC 5.56 4.70 - 6.10 M/uL    Hemoglobin 16.1 14.0 - 18.0 g/dL    Hematocrit 46.3 42.0 - 52.0 %    MCV 83.3 81.4 - 97.8 fL    MCH 29.0 27.0 - 33.0 pg    MCHC 34.8 32.3 - 36.5 g/dL    RDW 38.4 35.9 - 50.0 fL    Platelet Count 261 164 - 446 K/uL    MPV 9.8 9.0 - 12.9 fL   Comp Metabolic Panel    Collection Time: 02/24/24  8:29 PM   Result Value Ref Range    Sodium 140 135 - 145 mmol/L    Potassium 3.8 3.6 - 5.5 mmol/L    Chloride 103 96 - 112 mmol/L    Co2 22 20 - 33 mmol/L    Anion Gap 15.0 7.0 - 16.0    Glucose 160 (H) 65 - 99 mg/dL    Bun 15 8 - 22 mg/dL    Creatinine 0.83 0.50 - 1.40 mg/dL    Calcium 9.6 8.5 - 10.5 mg/dL    Correct Calcium 9.1 8.5 - 10.5 mg/dL    AST(SGOT) 29 12 - 45 U/L    ALT(SGPT) 29 2 - 50 U/L    Alkaline Phosphatase 78 30 - 99 U/L    Total Bilirubin 0.5 0.1 - 1.5 mg/dL    Albumin 4.6 3.2 - 4.9 g/dL    Total Protein 7.4 6.0 - 8.2 g/dL    Globulin 2.8 1.9 - 3.5 g/dL    A-G Ratio 1.6 g/dL   Prothrombin Time    Collection Time: 02/24/24  8:29 PM   Result Value Ref Range    PT 13.8 12.0 - 14.6 sec    INR 1.05 0.87 - 1.13   APTT    Collection Time: 02/24/24  8:29 PM   Result Value Ref Range    APTT 26.9 24.7 - 36.0 sec   COD - Adult (Type and Screen)    Collection Time: 02/24/24  8:29 PM   Result Value Ref Range    ABO Grouping Only O     Rh Grouping Only POS     Antibody Screen-Cod NEG    ABO Rh Confirm    Collection Time: 02/24/24  8:29 PM   Result Value Ref Range    ABO Rh Confirm O POS    ESTIMATED GFR    Collection Time: 02/24/24  8:29 PM   Result Value Ref Range    GFR (CKD-EPI) 108 >60  mL/min/1.73 m 2   POCT glucose device results    Collection Time: 02/25/24  5:07 AM   Result Value Ref Range    POC Glucose, Blood 133 (H) 65 - 99 mg/dL       Fluids    Intake/Output Summary (Last 24 hours) at 2/25/2024 1100  Last data filed at 2/24/2024 2037  Gross per 24 hour   Intake 0 ml   Output 0 ml   Net 0 ml       Core Measures & Quality Metrics    Galdamez catheter: No Galdamez      DVT Prophylaxis: Enoxaparin (Lovenox)  DVT prophylaxis - mechanical: SCDs      Assessed for rehab: Patient returned to prior level of function, rehabilitation not indicated at this time    RAP Score Total: 0    CAGE Results: not completed Blood Alcohol>0.08: no       Assessment/Plan  * Trauma- (present on admission)  Assessment & Plan  Sledding accident.  Trauma Green Transfer Activation from [facility] in [place], [place].  Scar Ventura MD, Trauma Surgery      Burst fracture of twelfth thoracic vertebra (HCC)- (present on admission)  Assessment & Plan  T12 burst fracture with 5 mm retropulsion without central canal stenosis.  Non-operative management.  TLSO when out of bed.  Will need upright films when able to ambulate with TLSO  Henrique Wick MD. Neurosurgeon. [facility].     No contraindication to deep vein thrombosis (DVT) prophylaxis- (present on admission)  Assessment & Plan  VTE prophylaxis initially contraindicated secondary to elevated bleeding risk.  2/25 Trauma surveillance venous duplex ultrasonography ordered.  2/25 Prophylactic dose enoxaparin 40 mg BID initiated.         Discussed patient condition with RN, Patient, and trauma surgery Dr. Ventura .

## 2024-02-25 NOTE — ED NOTES
Report given to T4 BRANDON James. Pt transferred to T429 with transport, all belongings, and TLSO. Care relinquished.

## 2024-02-25 NOTE — H&P
Trauma History and Physical  2/24/2024    Time called: 2256  Time at bedside: 2306    Attending Physician: Scar Ventura MD.     Referring Physician: Dr. Lockett    CC: Trauma The patient was triaged as a Trauma Green Transfer in accordance with established pre hospital protocols. An expeditious primary and secondary survey with required adjuncts was conducted. See trauma narrator for full details.    HPI: This is a 48 y.o. male who was injured while sledding today. He was not wearing a helmet, did not hit his head or lose consciousness, but did feel something crack in his lower back. He was neurologically normal and taken to a referring hospital where CT of the L spine only was done revealing a T 12 burst fracture. He was transferred to [facility] for definitive neurotrauma care.     PMHx: DM, HTN    PSHx: denies major prior surgery    No current facility-administered medications for this encounter.     No current outpatient medications on file.       Social History     Tobacco Use    Smoking status: Not on file    Smokeless tobacco: Not on file   Substance Use Topics    Alcohol use: Not on file       No family history on file.    Allergies:  Patient has no known allergies.    Review of Systems:  Constitutional: Negative for fever, chills, weight loss, malaise/fatigue and diaphoresis.   HENT: Negative for hearing loss, ear pain, nosebleeds, congestion, sore throat, neck pain, and ear discharge.    Eyes: Negative for blurred vision, double vision, and redness.   Respiratory: Negative for cough, sputum production, shortness of breath, wheezing and stridor.    Cardiovascular: Negative for chest pain, palpitations.   Gastrointestinal: Negative for heartburn, nausea, vomiting, abdominal pain, diarrhea, constipation.  Genitourinary: Negative for dysuria, urgency, frequency.   Musculoskeletal: Negative for myalgias,  joint pain and falls. Low back pain per hpi.   Skin: Negative for itching and rash.  Neurological:  "Negative for dizziness, loss of consciousness, weakness and headaches.   Endo/Heme/Allergies: Negative for environmental allergies. Does not bruise/bleed easily.   Psychiatric/Behavioral: Negative for depression and substance abuse. The patient is not nervous/anxious.    Physical Exam:  /69   Pulse 82   Temp 35.8 °C (96.5 °F) (Temporal)   Resp 16   Ht 1.727 m (5' 8\")   Wt 81.6 kg (180 lb)   SpO2 90%     Constitutional: Awake, alert, oriented x3. No acute distress. GCS 15. E4 V5 M6.  Head: No cephalohematoma. Pupils 4-3 reactive bilaterally. Midface stable. No malocclusion.    Neck: No tracheal deviation. No midline cervical spine tenderness.  No cervical seatbelt sign.  Cardiovascular: Normal rate, regular rhythm.  Pulmonary/Chest: Clavicles nontender to palpation. There is not any chest wall tenderness bilaterally.  No crepitus. Positive breath sounds bilaterally.   Abdominal: Soft, nondistended. Nontender to palpation. Pelvis is stable to anterior-posterior compression.   Musculoskeletal: Right upper extremity grossly atraumatic, palpable radial pulse. 5/5  strength. Full ROM and strength at elbow.  Left upper extremity grossly atraumatic, palpable radial pulse. 5/5  strength. Full ROM and strength at elbow.  Right lower extremity grossly atraumatic. 5/5 strength in ankle plantar flexion and dorsiflexion. No pain and full ROM at right knee and hip. 2+ DP pulse.  Left  lower extremity grossly atraumatic. 5/5 strength in ankle plantar flexion and dorsiflexion. No pain and full ROM at left knee and hip. 2+ DP pulse.  Back: Tenderness to palpation in lower back. No step-offs. Mild sacral erythema present.  : Normal male external genitalia. Rectal exam not done. No blood visible at urethral meatus.   Neurological: Sensation grossly intact to light touch dorsum and plantar surfaces of both feet and the medial and lateral aspects of both lower legs.  Sensation grossly intact to light touch dorsum " and plantar surfaces of both hands.   Skin: Skin is warm and dry.  No diaphoresis. No erythema. No pallor.   Psychiatric:  Normal mood and affect.  Behavior is appropriate for situation.        Labs:  Recent Labs     02/24/24 2029   WBC 20.8*   RBC 5.56   HEMOGLOBIN 16.1   HEMATOCRIT 46.3   MCV 83.3   MCH 29.0   MCHC 34.8   RDW 38.4   PLATELETCT 261   MPV 9.8     Recent Labs     02/24/24 2029   SODIUM 140   POTASSIUM 3.8   CHLORIDE 103   CO2 22   GLUCOSE 160*   BUN 15   CREATININE 0.83   CALCIUM 9.6     Recent Labs     02/24/24 2029   APTT 26.9   INR 1.05     Recent Labs     02/24/24 2029   ASTSGOT 29   ALTSGPT 29   TBILIRUBIN 0.5   ALKPHOSPHAT 78   GLOBULIN 2.8   INR 1.05         Radiology:  CT-TSPINE W/O PLUS RECONS   Final Result      1.  T12 burst fracture with moderate loss of height and 30% canal stenosis, as seen on prior outside CT lumbar spine.   2.  No segmental malalignment.   3.  Mild multilevel degenerative change of thoracic spine.      CT-CSPINE WITHOUT PLUS RECONS    (Results Pending)   CT-LSPINE W/O PLUS RECONS    (Results Pending)         Assessment: This is a 48 y.o. male with a T 12 burst fracture after a sledding crash. I have requested CT imaging of his C and L spine and then will admit to trevizo. NPO for now, diet per Dr. Gold given the undetermined need or timing of OR.  He will need his home meds restarted in the coming days as his dosages become known.     Plan: Admit to trevizo  Burst fracture of twelfth thoracic vertebra (HCC)- (present on admission)  Assessment & Plan  T12 burst fracture with 5 mm retropulsion without central canal stenosis.  Definitive plan pending.   Henrique Wick MD. Neurosurgeon. [facility]. Consult pending    Primary hypertension- (present on admission)  Assessment & Plan  Chronic condition treated with metoprolol and losartan.  Medication reconciliation pending.    Dyslipidemia- (present on admission)  Assessment & Plan  Chronic condition treated with  atorvastatin.  Medication reconciliation pending .    Type 2 diabetes mellitus (HCC)- (present on admission)  Assessment & Plan  Chronic condition treated with metformin.  Medication reconciliation pending.    Contraindication to deep vein thrombosis (DVT) prophylaxis- (present on admission)  Assessment & Plan  VTE prophylaxis initially contraindicated secondary to elevated bleeding risk.  2/26 Trauma surveillance venous duplex ultrasonography ordered.    Trauma- (present on admission)  Assessment & Plan  Sledding accident.  Trauma Green Transfer Activation from [facility] in [place], [place].  Scar Ventura MD, Trauma Surgery            Time spent: 48 minutes.        Scar Ventura MD  [phone]

## 2024-02-26 ENCOUNTER — APPOINTMENT (OUTPATIENT)
Dept: RADIOLOGY | Facility: MEDICAL CENTER | Age: 49
DRG: 552 | End: 2024-02-26
Attending: NURSE PRACTITIONER
Payer: COMMERCIAL

## 2024-02-26 PROBLEM — K56.7 ILEUS (HCC): Status: ACTIVE | Noted: 2024-02-26

## 2024-02-26 LAB
ANION GAP SERPL CALC-SCNC: 13 MMOL/L (ref 7–16)
BASOPHILS # BLD AUTO: 0.3 % (ref 0–1.8)
BASOPHILS # BLD: 0.03 K/UL (ref 0–0.12)
BUN SERPL-MCNC: 20 MG/DL (ref 8–22)
CALCIUM SERPL-MCNC: 9.2 MG/DL (ref 8.5–10.5)
CHLORIDE SERPL-SCNC: 102 MMOL/L (ref 96–112)
CO2 SERPL-SCNC: 25 MMOL/L (ref 20–33)
CREAT SERPL-MCNC: 0.82 MG/DL (ref 0.5–1.4)
EOSINOPHIL # BLD AUTO: 0.01 K/UL (ref 0–0.51)
EOSINOPHIL NFR BLD: 0.1 % (ref 0–6.9)
ERYTHROCYTE [DISTWIDTH] IN BLOOD BY AUTOMATED COUNT: 37.7 FL (ref 35.9–50)
GFR SERPLBLD CREATININE-BSD FMLA CKD-EPI: 108 ML/MIN/1.73 M 2
GLUCOSE BLD STRIP.AUTO-MCNC: 138 MG/DL (ref 65–99)
GLUCOSE SERPL-MCNC: 122 MG/DL (ref 65–99)
HCT VFR BLD AUTO: 43.8 % (ref 42–52)
HGB BLD-MCNC: 15.6 G/DL (ref 14–18)
IMM GRANULOCYTES # BLD AUTO: 0.07 K/UL (ref 0–0.11)
IMM GRANULOCYTES NFR BLD AUTO: 0.6 % (ref 0–0.9)
LYMPHOCYTES # BLD AUTO: 0.73 K/UL (ref 1–4.8)
LYMPHOCYTES NFR BLD: 6.3 % (ref 22–41)
MCH RBC QN AUTO: 29 PG (ref 27–33)
MCHC RBC AUTO-ENTMCNC: 35.6 G/DL (ref 32.3–36.5)
MCV RBC AUTO: 81.4 FL (ref 81.4–97.8)
MONOCYTES # BLD AUTO: 0.53 K/UL (ref 0–0.85)
MONOCYTES NFR BLD AUTO: 4.5 % (ref 0–13.4)
NEUTROPHILS # BLD AUTO: 10.28 K/UL (ref 1.82–7.42)
NEUTROPHILS NFR BLD: 88.2 % (ref 44–72)
NRBC # BLD AUTO: 0 K/UL
NRBC BLD-RTO: 0 /100 WBC (ref 0–0.2)
PLATELET # BLD AUTO: 231 K/UL (ref 164–446)
PMV BLD AUTO: 9.6 FL (ref 9–12.9)
POTASSIUM SERPL-SCNC: 3.7 MMOL/L (ref 3.6–5.5)
RBC # BLD AUTO: 5.38 M/UL (ref 4.7–6.1)
SODIUM SERPL-SCNC: 140 MMOL/L (ref 135–145)
WBC # BLD AUTO: 11.7 K/UL (ref 4.8–10.8)

## 2024-02-26 PROCEDURE — 700111 HCHG RX REV CODE 636 W/ 250 OVERRIDE (IP): Performed by: NURSE PRACTITIONER

## 2024-02-26 PROCEDURE — 80048 BASIC METABOLIC PNL TOTAL CA: CPT

## 2024-02-26 PROCEDURE — 74018 RADEX ABDOMEN 1 VIEW: CPT

## 2024-02-26 PROCEDURE — 97116 GAIT TRAINING THERAPY: CPT

## 2024-02-26 PROCEDURE — 770001 HCHG ROOM/CARE - MED/SURG/GYN PRIV*

## 2024-02-26 PROCEDURE — 700101 HCHG RX REV CODE 250: Performed by: SURGERY

## 2024-02-26 PROCEDURE — 36415 COLL VENOUS BLD VENIPUNCTURE: CPT

## 2024-02-26 PROCEDURE — A9270 NON-COVERED ITEM OR SERVICE: HCPCS | Performed by: NURSE PRACTITIONER

## 2024-02-26 PROCEDURE — 85025 COMPLETE CBC W/AUTO DIFF WBC: CPT

## 2024-02-26 PROCEDURE — 700102 HCHG RX REV CODE 250 W/ 637 OVERRIDE(OP): Performed by: SURGERY

## 2024-02-26 PROCEDURE — 97530 THERAPEUTIC ACTIVITIES: CPT

## 2024-02-26 PROCEDURE — 82962 GLUCOSE BLOOD TEST: CPT

## 2024-02-26 PROCEDURE — 700102 HCHG RX REV CODE 250 W/ 637 OVERRIDE(OP): Performed by: NURSE PRACTITIONER

## 2024-02-26 PROCEDURE — A9270 NON-COVERED ITEM OR SERVICE: HCPCS | Performed by: SURGERY

## 2024-02-26 PROCEDURE — 99232 SBSQ HOSP IP/OBS MODERATE 35: CPT | Performed by: NURSE PRACTITIONER

## 2024-02-26 RX ORDER — BISACODYL 10 MG
10 SUPPOSITORY, RECTAL RECTAL ONCE
Status: COMPLETED | OUTPATIENT
Start: 2024-02-26 | End: 2024-02-26

## 2024-02-26 RX ADMIN — ACETAMINOPHEN 650 MG: 325 TABLET, FILM COATED ORAL at 03:44

## 2024-02-26 RX ADMIN — POLYETHYLENE GLYCOL 3350 1 PACKET: 17 POWDER, FOR SOLUTION ORAL at 03:44

## 2024-02-26 RX ADMIN — METAXALONE 800 MG: 800 TABLET ORAL at 03:44

## 2024-02-26 RX ADMIN — SODIUM PHOSPHATE 133 ML: 7; 19 ENEMA RECTAL at 21:06

## 2024-02-26 RX ADMIN — ACETAMINOPHEN 650 MG: 325 TABLET, FILM COATED ORAL at 18:47

## 2024-02-26 RX ADMIN — ENOXAPARIN SODIUM 30 MG: 100 INJECTION SUBCUTANEOUS at 18:47

## 2024-02-26 RX ADMIN — ENOXAPARIN SODIUM 30 MG: 100 INJECTION SUBCUTANEOUS at 03:44

## 2024-02-26 RX ADMIN — BISACODYL 10 MG: 10 SUPPOSITORY RECTAL at 18:47

## 2024-02-26 RX ADMIN — DOCUSATE SODIUM 100 MG: 100 CAPSULE, LIQUID FILLED ORAL at 03:44

## 2024-02-26 RX ADMIN — MAGNESIUM HYDROXIDE 30 ML: 1200 LIQUID ORAL at 03:44

## 2024-02-26 RX ADMIN — GABAPENTIN 100 MG: 100 CAPSULE ORAL at 20:17

## 2024-02-26 RX ADMIN — METAXALONE 800 MG: 800 TABLET ORAL at 18:47

## 2024-02-26 RX ADMIN — GABAPENTIN 100 MG: 100 CAPSULE ORAL at 03:44

## 2024-02-26 ASSESSMENT — PAIN DESCRIPTION - PAIN TYPE
TYPE: ACUTE PAIN

## 2024-02-26 ASSESSMENT — COGNITIVE AND FUNCTIONAL STATUS - GENERAL
MOVING FROM LYING ON BACK TO SITTING ON SIDE OF FLAT BED: A LITTLE
WALKING IN HOSPITAL ROOM: A LITTLE
STANDING UP FROM CHAIR USING ARMS: A LITTLE
SUGGESTED CMS G CODE MODIFIER MOBILITY: CK
CLIMB 3 TO 5 STEPS WITH RAILING: A LITTLE
TURNING FROM BACK TO SIDE WHILE IN FLAT BAD: A LITTLE
MOBILITY SCORE: 18
MOVING TO AND FROM BED TO CHAIR: A LITTLE

## 2024-02-26 ASSESSMENT — ENCOUNTER SYMPTOMS
NEUROLOGICAL NEGATIVE: 1
CONSTITUTIONAL NEGATIVE: 1
ABDOMINAL PAIN: 1
NAUSEA: 1
BACK PAIN: 1

## 2024-02-26 ASSESSMENT — GAIT ASSESSMENTS
DEVIATION: BRADYKINETIC
DISTANCE (FEET): 250
GAIT LEVEL OF ASSIST: SUPERVISED
ASSISTIVE DEVICE: FRONT WHEEL WALKER

## 2024-02-26 NOTE — DISCHARGE PLANNING
Care Transition Team Assessment    Information Source  Orientation Level: Oriented X4  Information Given By: Patient  Informant's Name: Michael  Who is responsible for making decisions for patient? : Patient    Readmission Evaluation  Is this a readmission?: No    Elopement Risk  Legal Hold: No  Ambulatory or Self Mobile in Wheelchair: Yes  Disoriented: No  Psychiatric Symptoms: None  History of Wandering: No  Elopement this Admit: No  Vocalizing Wanting to Leave: No  Displays Behaviors, Body Language Wanting to Leave: No-Not at Risk for Elopement  Elopement Risk: Not at Risk for Elopement    Interdisciplinary Discharge Planning  Does Admitting Nurse Feel This Could be a Complex Discharge?: No  Primary Care Physician: None-patient declines assistance  Lives with - Patient's Self Care Capacity: Child Less than 18 Years of Age (50/50 custody)  Patient or legal guardian wants to designate a caregiver: No  Support Systems: Friends / Neighbors, Family Member(s)  Housing / Facility: 1 Story House (Rents a room in a garage)  Do You Take your Prescribed Medications Regularly: Yes  Able to Return to Previous ADL's: Future Time w/Therapy  Mobility Issues: No  Prior Services: None, Home-Independent  Assistance Needed: No  Durable Medical Equipment: Walker    Discharge Preparedness  What is your plan after discharge?: Home with help  What are your discharge supports?: Child, Sibling  Prior Functional Level: Ambulatory, Drives Self, Independent with Activities of Daily Living, Independent with Medication Management  Difficulity with ADLs: None  Difficulity with IADLs: None    Functional Assesment  Prior Functional Level: Ambulatory, Drives Self, Independent with Activities of Daily Living, Independent with Medication Management    Finances  Financial Barriers to Discharge: Yes  Source of Income: Employed  Prescription Coverage: No  Prescription Coverage Comments: Not currently insured (Pending [employer])    Vision / Hearing  Impairment  Vision Impairment : No  Hearing Impairment : No    Values / Beliefs / Concerns  Values / Beliefs Concerns : No    Advance Directive  Advance Directive?: None    Domestic Abuse  Have you ever been the victim of abuse or violence?: No  Physical Abuse or Sexual Abuse: No  Verbal Abuse or Emotional Abuse: No  Possible Abuse/Neglect Reported to:: Not Applicable    Psychological Assessment  History of Substance Abuse: None  History of Psychiatric Problems: No  Non-compliant with Treatment: No  Newly Diagnosed Illness: Yes    Discharge Risks or Barriers  Discharge risks or barriers?: Complex medical needs, Uninsured / underinsured  Patient risk factors: Cognitive / sensory / physical deficit, Complex medical needs, Language barrier, Uninsured or underinsured    Anticipated Discharge Information  Discharge Disposition: Discharged to home/self care (01)

## 2024-02-26 NOTE — CARE PLAN
Problem: Knowledge Deficit - Standard  Goal: Patient and family/care givers will demonstrate understanding of plan of care, disease process/condition, diagnostic tests and medications  Description: Target End Date:  1-3 days or as soon as patient condition allows    Document in Patient Education    1.  Patient and family/caregiver oriented to unit, equipment, visitation policy and means for communicating concern  2.  Complete/review Learning Assessment  3.  Assess knowledge level of disease process/condition, treatment plan, diagnostic tests and medications  4.  Explain disease process/condition, treatment plan, diagnostic tests and medications  Outcome: Progressing   The patient is Stable - Low risk of patient condition declining or worsening    Shift Goals  Clinical Goals: pain control, OOB  Patient Goals: rest    Progress made toward(s) clinical / shift goals:  POC discussed with the patient and verbalized understanding, call bell within reach.

## 2024-02-26 NOTE — THERAPY
"Physical Therapy   Discharge     Patient Name: Michael Singh  Age:  48 y.o., Sex:  male  Medical Record #: [id number]  Today's Date: 2/26/2024     Precautions  Precautions: (P) Fall Risk;TLSO (Thoracolumbosacral orthosis);Spinal / Back Precautions   Comments: (P) TLSO OOB    Assessment    Pt seen for follow-up PT session. Pt demos all functional mobility with SPV and was able to complete 2 steps with railings. Pt properly donned TLSO with no cuing and performed a log roll as well. Pt is complaining of abdominal pain that is worse OOB and he is not eating because of it, nurse notified. Pt has met all therapy goals and is discharged from PT. Recommend discharge home with home health for further therapy needs.     Plan    Reason for Discharge From Therapy: (P) Discharge Secondary to Goals Met    DC Equipment Recommendations: (P) Front-Wheel Walker  Discharge Recommendations: (P) Recommend home health for continued physical therapy services      Subjective    \"I haven't been able to eat because of my stomach pain.\"     Objective       02/26/24 0945   Precautions   Precautions Fall Risk;TLSO (Thoracolumbosacral orthosis);Spinal / Back Precautions    Comments TLSO OOB   Vitals   Patient BP Position Sitting   Blood Pressure 134/89   O2 (LPM) 0   O2 Delivery Device None - Room Air   Vitals Comments Supine BP: 136/87. Minimal c/o dizziness w/ ambulation   Pain 0 - 10 Group   Location Abdomen   Location Orientation Anterior   Pain Rating Scale (NPRS) 5   Description Aching   Therapist Pain Assessment Post Activity Pain Same as Prior to Activity;Nurse Notified;5   Cognition    Cognition / Consciousness WDL   Level of Consciousness Alert   Comments Pleasant and motivated for therapy. Pt is soft spoken and demos anxiety with movement   Active ROM Lower Body    Active ROM Lower Body  WDL   Strength Lower Body   Lower Body Strength  WDL   Sensation Lower Body   Lower Extremity Sensation   WDL   Standing Lower Body Exercises   Standing " Lower Body Exercises Yes   Marching 1 set of 10   Other Treatments   Other Treatments Provided TLSO fitting, education. Discussion about sitting for drive back to [place]. Spinal precautions, log roll.   Balance   Sitting Balance (Static) Fair +   Sitting Balance (Dynamic) Fair   Standing Balance (Static) Fair +   Standing Balance (Dynamic) Fair   Weight Shift Sitting Fair   Weight Shift Standing Fair   Skilled Intervention Compensatory Strategies;Postural Facilitation;Sequencing;Tactile Cuing;Verbal Cuing   Comments FWW. limited by back pain, TLSO   Bed Mobility    Supine to Sit Supervised   Scooting Supervised   Rolling Supervised   Skilled Intervention Compensatory Strategies;Postural Facilitation;Sequencing;Tactile Cuing;Verbal Cuing   Comments HOB flat, log roll L   Gait Analysis   Gait Level Of Assist Supervised   Assistive Device Front Wheel Walker   Distance (Feet) 250   # of Times Distance was Traveled 1   Deviation Bradykinetic   # of Stairs Climbed 2   Level of Assist with Stairs Standby Assist   Skilled Intervention Compensatory Strategies;Postural Facilitation;Verbal Cuing   Comments Pt demos bradykinesia in gait 2/2 ab and back pain. He was able to complete stairs with grab rail   Functional Mobility   Sit to Stand Supervised   Bed, Chair, Wheelchair Transfer Supervised   Transfer Method Stand Step   Mobility supine->EOB->hallway->stairs->chair   Skilled Intervention Compensatory Strategies;Postural Facilitation;Sequencing;Verbal Cuing   Comments maintains spinal precautions   How much difficulty does the patient currently have...   Turning over in bed (including adjusting bedclothes, sheets and blankets)? 3   Sitting down on and standing up from a chair with arms (e.g., wheelchair, bedside commode, etc.) 3   Moving from lying on back to sitting on the side of the bed? 3   How much help from another person does the patient currently need...   Moving to and from a bed to a chair (including a  wheelchair)? 3   Need to walk in a hospital room? 3   Climbing 3-5 steps with a railing? 3   6 clicks Mobility Score 18   Activity Tolerance   Sitting in Chair 5 min. Up post session   Sitting Edge of Bed 3 min   Standing 7 min   Short Term Goals    Short Term Goal # 1 Pt will ambulate 250 ft with LRAD and SPV in 6 visits to [facility] imporved activity tolerance   Goal Outcome # 1 Goal met   Short Term Goal # 2 Pt will complete all bed mobility with no  bed features and SPV, maintaining spinal precautions, to [facility] improved independence at home   Goal Outcome # 2 Goal met   Short Term Goal # 3 pt will complete all functional mobility with LRAD and SPV in 6 visits to [facility] improved functional mobility.   Goal Outcome # 3 Goal met   Education Group   Education Provided Stair Training   Role of Physical Therapist Patient Response Patient;Eager;Explanation;Verbal Demonstration   Gait Training Patient Response Patient;Eager;Explanation;Action Demonstration   Stair Training Patient Response Patient;Eager;Explanation;Verbal Demonstration   Use of Assistive Device Patient Response Patient;Eager;Explanation;Action Demonstration   Brace Wear & Care Patient Response Patient;Eager;Explanation;Verbal Demonstration   Physical Therapy Treatment Plan   Physical Therapy Treatment Plan Modify Current Treatment Plan   Reason For Discharge Discharge Secondary to Goals Met   Anticipated Discharge Equipment and Recommendations   DC Equipment Recommendations Front-Wheel Walker   Discharge Recommendations Recommend home health for continued physical therapy services   Interdisciplinary Plan of Care Collaboration   IDT Collaboration with  Nursing;Occupational Therapist   Patient Position at End of Therapy Seated;Chair Alarm On;Call Light within Reach;Tray Table within Reach   Collaboration Comments Dc recs, mobility status   Session Information   Date / Session Number  2/26- DC PT- goals met       Joey Zaidi, SPT

## 2024-02-26 NOTE — DISCHARGE PLANNING
Case Management Discharge Planning    Admission Date: 2/24/2024  GMLOS: 2.9  ALOS: 2    6-Clicks ADL Score: 18  6-Clicks Mobility Score: 18      Anticipated Discharge Dispo: Discharge Disposition: Discharged to home/self care (01)    DME Needed: Yes    DME Ordered: No; RNCM request provider to place DME order    Action(s) Taken: DC Assessment Complete (See below), Choice obtained, and Referral(s) sent    RNCM met with patient at bedside to complete assessment and discuss discharge planning.     Patient is currently employed as a  and is renting a room in a garage in [place], [place]    Patient is currently pending [facility] and has no PCP; patient declines assistance with obtaining PCP appt.    RNCM reached out to provider via Voalte to obtain OP PT/OT script/order for FWW per PT/OT recommendations.     DME FWW received from provider; RNCM requested bedside RN order FWW from traction.     Escalations Completed: None    Medically Clear: No    Next Steps: CM to continue to follow up with IDT regarding discharge planning needs/barriers.     Barriers to Discharge: Medical clearance and DME

## 2024-02-26 NOTE — THERAPY
"Physical Therapy   Initial Evaluation     Patient Name: Michael Singh  Age:  48 y.o., Sex:  male  Medical Record #: [id number]  Today's Date: 2/25/2024     Precautions  Precautions: Fall Risk;TLSO (Thoracolumbosacral orthosis);Spinal / Back Precautions   Comments: TLSO OOB    Assessment    48 y.o. male who presented 2/24/2024 with T12 burst fracture with mild kyphosis after a sledding accident. Per neuro, pt is awaiting upright imaging post mobilization. Pt presents to therapy with impairments in activity tolerance, strength and pain management. Pt reports decreased pain when OOB wth TLSO, pt is able to ambulate 150 ft with FWW and demos increased lateral sway but overall good balance. After ambulation and chair transfer pt had c/o dizziness and sweating- his BP was 120/71 and pt requested to go back to bed. Pt required CGA/SBA for all functional mobility. Pt was able to maintain precautions throughout session and correctly recalled them at the end. Pt lives alone in [place], [place] and has daughter that splits time at his house. Recommend discharge home with home health for further therapy needs, will follow in-house.     Plan    Physical Therapy Initial Treatment Plan   Treatment Plan : Bed Mobility, Equipment, Gait Training, Neuro Re-Education / Balance, Self Care / Home Evaluation, Stair Training, Therapeutic Exercise  Treatment Frequency: 4 Times per Week  Duration: Until Therapy Goals Met    DC Equipment Recommendations: Front-Wheel Walker  Discharge Recommendations: Recommend home health for continued physical therapy services       Subjective    \"I'm scared to try to get up.\"     Objective       02/25/24 1400   Precautions   Precautions Fall Risk;TLSO (Thoracolumbosacral orthosis);Spinal / Back Precautions    Comments TLSO OOB   Vitals   Patient BP Position Sitting   Blood Pressure 120/71   O2 (LPM) 0   O2 Delivery Device None - Room Air   Vitals Comments pt reports dizziness in upright postures. BP taken in chair " after ambulation. Pt's dizziness did not improve with rest and was placed back in bed.   Pain 0 - 10 Group   Location Back   Location Orientation Lower   Pain Rating Scale (NPRS) 3   Description Aching   Therapist Pain Assessment During Activity;5;Nurse Notified   Prior Living Situation   Prior Services None   Housing / Facility 1 Story House   Steps Into Home 1   Steps In Home 1   Rail None   Equipment Owned None   Lives with - Patient's Self Care Capacity Alone and Able to Care For Self   Comments Pt has 12 y.o. daughter that spends some time at his house. He has no friends or family close-by to assist.   Prior Level of Functional Mobility   Bed Mobility Independent   Transfer Status Independent   Ambulation Independent   Ambulation Distance Community   Assistive Devices Used None   Stairs Independent   Comments Pt works as a self-employed    History of Falls   History of Falls No   Cognition    Cognition / Consciousness WDL   Level of Consciousness Alert   Comments Pt is pleasant and cooperative. Demod some anxiety with standing at beginning of session   Active ROM Upper Body   Active ROM Upper Body  WDL   Strength Upper Body   Upper Body Strength  X   Comments Generalized weakness, equal bilaterally   Sensation Upper Body   Upper Extremity Sensation  WDL   Active ROM Lower Body    Active ROM Lower Body  WDL   Strength Lower Body   Lower Body Strength  X   Comments Generalized weakness, equal bilaterally   Sensation Lower Body   Lower Extremity Sensation   WDL   Other Treatments   Other Treatments Provided TLSO fitting and care, education: spinal precautions, sitting tolerance for drive home, incentive spirometer   Balance Assessment   Sitting Balance (Static) Fair +   Sitting Balance (Dynamic) Fair   Standing Balance (Static) Fair   Standing Balance (Dynamic) Fair   Weight Shift Sitting Fair   Weight Shift Standing Fair   Comments FWW   Bed Mobility    Supine to Sit Standby Assist   Sit to Supine Standby  Assist   Scooting Standby Assist   Rolling Standby Assist   Comments HOB flat, use of R rail. Mod cuing for log roll   Gait Analysis   Gait Level Of Assist Contact Guard Assist   Assistive Device Front Wheel Walker   Distance (Feet) 150   # of Times Distance was Traveled 1   Deviation Bradykinetic   Comments Pt demos increased lateral sway, bradykinesia, but ambulates well with pain decrease from TLSo   Functional Mobility   Sit to Stand Standby Assist   Bed, Chair, Wheelchair Transfer Standby Assist   Toilet Transfers Contact Guard Assist   Transfer Method Stand Step   Mobility Supine->EOB->hallway->bathroom->chair->supine   Comments Able to maintain precautions   How much difficulty does the patient currently have...   Turning over in bed (including adjusting bedclothes, sheets and blankets)? 2   Sitting down on and standing up from a chair with arms (e.g., wheelchair, bedside commode, etc.) 3   Moving from lying on back to sitting on the side of the bed? 3   How much help from another person does the patient currently need...   Moving to and from a bed to a chair (including a wheelchair)? 3   Need to walk in a hospital room? 3   Climbing 3-5 steps with a railing? 2   6 clicks Mobility Score 16   Activity Tolerance   Sitting in Chair 3 min   Sitting Edge of Bed 5 min   Standing 10 min   Comments Chair tolerance limited 2/2 dizziness   Edema / Skin Assessment   Edema / Skin  WDL   Short Term Goals    Short Term Goal # 1 Pt will ambulate 250 ft with LRAD and SPV in 6 visits to [facility] imporved activity tolerance   Short Term Goal # 2 Pt will complete all bed mobility with no  bed features and SPV, maintaining spinal precautions, to [facility] improved independence at home   Short Term Goal # 3 pt will complete all functional mobility with LRAD and SPV in 6 visits to [facility] improved functional mobility.   Education Group   Education Provided Role of Physical Therapist;Gait Training;Use of Assistive Device;Brace Wear and Care    Role of Physical Therapist Patient Response Patient;Eager;Explanation;Verbal Demonstration   Gait Training Patient Response Patient;Eager;Explanation;Action Demonstration   Use of Assistive Device Patient Response Patient;Eager;Demonstration;Action Demonstration   Physical Therapy Initial Treatment Plan    Treatment Plan  Bed Mobility;Equipment;Gait Training;Neuro Re-Education / Balance;Self Care / Home Evaluation;Stair Training;Therapeutic Exercise   Treatment Frequency 4 Times per Week   Duration Until Therapy Goals Met   Problem List    Problems Pain;Impaired Bed Mobility;Impaired Transfers;Impaired Ambulation;Functional ROM Deficit;Functional Strength Deficit;Impaired Balance;Impaired Coordination;Decreased Activity Tolerance;Limited Knowledge of Post-Op Precautions   Anticipated Discharge Equipment and Recommendations   DC Equipment Recommendations Front-Wheel Walker   Discharge Recommendations Recommend home health for continued physical therapy services   Interdisciplinary Plan of Care Collaboration   IDT Collaboration with  Nursing;Occupational Therapist   Patient Position at End of Therapy In Bed;Call Light within Reach;Tray Table within Reach   Collaboration Comments DC recs, mobility status   Session Information   Date / Session Number  2/25- 1(1/4, 3/2)     Joey Zaidi, SPT

## 2024-02-26 NOTE — PROGRESS NOTES
Trauma / Surgical Daily Progress Note    Date of Service  2/26/2024    Chief Complaint  48 y.o. male admitted 2/24/2024 with T12 fracture    Interval Events  Complaining of abdominal pain and episode of vomiting  Patient is also feeling bloated and denies passing flatus.    States he does not feel hungry and has not had an appetite since vomiting  -KUB  -Suppository          Review of Systems  Review of Systems   Constitutional: Negative.    HENT: Negative.     Gastrointestinal:  Positive for abdominal pain and nausea.   Genitourinary: Negative.    Musculoskeletal:  Positive for back pain.   Neurological: Negative.         Vital Signs  Temp:  [36.5 °C (97.7 °F)-37 °C (98.6 °F)] 36.7 °C (98.1 °F)  Pulse:  [74-89] 85  Resp:  [16] 16  BP: (120-146)/(71-88) 124/79  SpO2:  [91 %-95 %] 94 %    Physical Exam  Physical Exam  Vitals and nursing note reviewed.   Constitutional:       Appearance: Normal appearance. He is normal weight.   HENT:      Mouth/Throat:      Pharynx: Oropharynx is clear.   Cardiovascular:      Rate and Rhythm: Normal rate.   Pulmonary:      Effort: Pulmonary effort is normal.   Abdominal:      General: There is distension.      Palpations: Abdomen is soft.      Tenderness: There is abdominal tenderness.   Musculoskeletal:         General: Normal range of motion.   Skin:     General: Skin is warm and dry.      Findings: No bruising.   Neurological:      General: No focal deficit present.      Mental Status: He is alert and oriented to person, place, and time.   Psychiatric:         Mood and Affect: Mood normal.         Behavior: Behavior normal.         Laboratory  Recent Results (from the past 24 hour(s))   CBC with Differential: Tomorrow AM    Collection Time: 02/25/24 11:32 AM   Result Value Ref Range    WBC 12.7 (H) 4.8 - 10.8 K/uL    RBC 5.12 4.70 - 6.10 M/uL    Hemoglobin 14.9 14.0 - 18.0 g/dL    Hematocrit 42.4 42.0 - 52.0 %    MCV 82.8 81.4 - 97.8 fL    MCH 29.1 27.0 - 33.0 pg    MCHC 35.1  32.3 - 36.5 g/dL    RDW 38.9 35.9 - 50.0 fL    Platelet Count 227 164 - 446 K/uL    MPV 9.8 9.0 - 12.9 fL    Neutrophils-Polys 79.90 (H) 44.00 - 72.00 %    Lymphocytes 11.40 (L) 22.00 - 41.00 %    Monocytes 7.70 0.00 - 13.40 %    Eosinophils 0.20 0.00 - 6.90 %    Basophils 0.20 0.00 - 1.80 %    Immature Granulocytes 0.60 0.00 - 0.90 %    Nucleated RBC 0.00 0.00 - 0.20 /100 WBC    Neutrophils (Absolute) 10.12 (H) 1.82 - 7.42 K/uL    Lymphs (Absolute) 1.44 1.00 - 4.80 K/uL    Monos (Absolute) 0.98 (H) 0.00 - 0.85 K/uL    Eos (Absolute) 0.03 0.00 - 0.51 K/uL    Baso (Absolute) 0.02 0.00 - 0.12 K/uL    Immature Granulocytes (abs) 0.07 0.00 - 0.11 K/uL    NRBC (Absolute) 0.00 K/uL   Basic Metabolic Panel (BMP): Tomorrow AM    Collection Time: 02/25/24 11:32 AM   Result Value Ref Range    Sodium 140 135 - 145 mmol/L    Potassium 3.7 3.6 - 5.5 mmol/L    Chloride 103 96 - 112 mmol/L    Co2 25 20 - 33 mmol/L    Glucose 111 (H) 65 - 99 mg/dL    Bun 19 8 - 22 mg/dL    Creatinine 0.92 0.50 - 1.40 mg/dL    Calcium 9.0 8.5 - 10.5 mg/dL    Anion Gap 12.0 7.0 - 16.0   ESTIMATED GFR    Collection Time: 02/25/24 11:32 AM   Result Value Ref Range    GFR (CKD-EPI) 102 >60 mL/min/1.73 m 2   POCT glucose device results    Collection Time: 02/26/24  3:54 AM   Result Value Ref Range    POC Glucose, Blood 138 (H) 65 - 99 mg/dL       Fluids  No intake or output data in the 24 hours ending 02/26/24 0833    Core Measures & Quality Metrics    Galdamez catheter: No Galdamez      DVT Prophylaxis: Enoxaparin (Lovenox)  DVT prophylaxis - mechanical: SCDs      Assessed for rehab: Patient returned to prior level of function, rehabilitation not indicated at this time    RAP Score Total: 0    CAGE Results: not completed Blood Alcohol>0.08: no       Assessment/Plan  * Trauma- (present on admission)  Assessment & Plan  Sledding accident.  Trauma Green Transfer Activation from [facility] in [place], [place].  Scar Ventura MD, Trauma  Surgery      Burst fracture of twelfth thoracic vertebra (HCC)- (present on admission)  Assessment & Plan  T12 burst fracture with 5 mm retropulsion without central canal stenosis.  Non-operative management.  TLSO when out of bed.  2/25 Upright films with stable loss of height  Henrique Wick MD. Neurosurgeon. [facility].     No contraindication to deep vein thrombosis (DVT) prophylaxis- (present on admission)  Assessment & Plan  VTE prophylaxis initially contraindicated secondary to elevated bleeding risk.  2/25 Trauma surveillance venous duplex ultrasonography ordered.  2/25 Prophylactic dose enoxaparin 40 mg BID initiated.         Discussed patient condition with Family, RN, Patient, and trauma surgery.  Dr. Ventura

## 2024-02-26 NOTE — CARE PLAN
The patient is Stable - Low risk of patient condition declining or worsening    Shift Goals  Clinical Goals: Pain management, IV hydration  Patient Goals: Mobility    Progress made toward(s) clinical / shift goals:  Pain managed per MAR. Patient mobilized with PT.    Patient is not progressing towards the following goals:

## 2024-02-26 NOTE — PROGRESS NOTES
Neurosurgery Progress Note    Subjective:  48 y.o. male who presented 2024 with T12 burst fracture with mild kyphosis after a sledding accident  Patient denies neurologic symptoms, endorses back pain at the site of injury.  Had some nausea with vomiting x1 last night.  Voiding fine, not passing flatus and not had a BM.  Mobilizing some.  Upright x-rays completed and look good/stable.      Exam:  Pleasant and cooperative  A&O x 4  B/L LE strength and sensation full and intact throughout      BP  Min: 120/73  Max: 146/88  Pulse  Av.3  Min: 74  Max: 89  Resp  Av  Min: 16  Max: 16  Temp  Av.7 °C (98.1 °F)  Min: 36.5 °C (97.7 °F)  Max: 37 °C (98.6 °F)  SpO2  Av.8 %  Min: 91 %  Max: 95 %    No data recorded    Recent Labs     24  1132 24  0816   WBC 20.8* 12.7* 11.7*   RBC 5.56 5.12 5.38   HEMOGLOBIN 16.1 14.9 15.6   HEMATOCRIT 46.3 42.4 43.8   MCV 83.3 82.8 81.4   MCH 29.0 29.1 29.0   MCHC 34.8 35.1 35.6   RDW 38.4 38.9 37.7   PLATELETCT 261 227 231   MPV 9.8 9.8 9.6     Recent Labs     24  1132 24  0816   SODIUM 140 140 140   POTASSIUM 3.8 3.7 3.7   CHLORIDE 103 103 102   CO2 22 25 25   GLUCOSE 160* 111* 122*   BUN 15 19 20   CREATININE 0.83 0.92 0.82   CALCIUM 9.6 9.0 9.2     Recent Labs     24   APTT 26.9   INR 1.05           Intake/Output                         24 - 24 0659 24 07 - 24 0659      Total  Total                 Intake    Total Intake -- -- -- -- -- --       Output    Urine  --  -- --  --  -- --    Number of Times Voided -- 1 x 1 x -- -- --    Stool  --  -- --  --  -- --    Number of Times Stooled 0 x -- 0 x -- -- --    Total Output -- -- -- -- -- --       Net I/O     -- -- -- -- -- --            No intake or output data in the 24 hours ending 24 1012            enoxaparin (LOVENOX) injection  30 mg Q12HRS    Respiratory Therapy Consult    Continuous RT    Pharmacy Consult Request  1 Each PHARMACY TO DOSE    ondansetron  4 mg Q4HRS PRN    ondansetron  4 mg Q4HRS PRN    docusate sodium  100 mg BID    senna-docusate  1 Tablet Nightly    senna-docusate  1 Tablet Q24HRS PRN    polyethylene glycol/lytes  1 Packet BID    magnesium hydroxide  30 mL DAILY    bisacodyl  10 mg Q24HRS PRN    sodium phosphate  1 Each Once PRN    acetaminophen  650 mg Q6HRS    Followed by    [START ON 3/1/2024] acetaminophen  650 mg Q6HRS PRN    oxyCODONE immediate-release  5 mg Q3HRS PRN    Or    oxyCODONE immediate-release  10 mg Q3HRS PRN    Or    HYDROmorphone  0.5 mg Q3HRS PRN    gabapentin  100 mg Q8HRS    metaxalone  800 mg TID    labetalol  10 mg Q4HRS PRN       Assessment and Plan:  Hospital day # 3  TLSO when OOB for 12 weeks, ok to remove for showering.  No lifting more than 15 pounds for 6 weeks until he can f/u with spine surgery near his home, and likely will not be cleared to lift >15 pounds for 3 months while wearing brace.    Pain control  Likely developing ileus and may need reglan.  Would be ok to d/c home per NS when cleared by trauma, likely in next day or two.  NS following    Chemical prophylactic DVT therapy: Yes - Lovenox 40mg/qd    Start date/time: 2/25

## 2024-02-27 ENCOUNTER — PHARMACY VISIT (OUTPATIENT)
Dept: PHARMACY | Facility: MEDICAL CENTER | Age: 49
End: 2024-02-27
Payer: COMMERCIAL

## 2024-02-27 VITALS
BODY MASS INDEX: 27.28 KG/M2 | WEIGHT: 180 LBS | RESPIRATION RATE: 16 BRPM | HEIGHT: 68 IN | SYSTOLIC BLOOD PRESSURE: 138 MMHG | DIASTOLIC BLOOD PRESSURE: 80 MMHG | OXYGEN SATURATION: 92 % | TEMPERATURE: 98.9 F | HEART RATE: 85 BPM

## 2024-02-27 LAB
ANION GAP SERPL CALC-SCNC: 11 MMOL/L (ref 7–16)
BASOPHILS # BLD AUTO: 0.3 % (ref 0–1.8)
BASOPHILS # BLD: 0.03 K/UL (ref 0–0.12)
BUN SERPL-MCNC: 17 MG/DL (ref 8–22)
CALCIUM SERPL-MCNC: 8.9 MG/DL (ref 8.5–10.5)
CHLORIDE SERPL-SCNC: 101 MMOL/L (ref 96–112)
CO2 SERPL-SCNC: 25 MMOL/L (ref 20–33)
CREAT SERPL-MCNC: 0.68 MG/DL (ref 0.5–1.4)
EOSINOPHIL # BLD AUTO: 0.11 K/UL (ref 0–0.51)
EOSINOPHIL NFR BLD: 1.2 % (ref 0–6.9)
ERYTHROCYTE [DISTWIDTH] IN BLOOD BY AUTOMATED COUNT: 37.6 FL (ref 35.9–50)
GFR SERPLBLD CREATININE-BSD FMLA CKD-EPI: 114 ML/MIN/1.73 M 2
GLUCOSE SERPL-MCNC: 111 MG/DL (ref 65–99)
HCT VFR BLD AUTO: 43.1 % (ref 42–52)
HGB BLD-MCNC: 15.3 G/DL (ref 14–18)
IMM GRANULOCYTES # BLD AUTO: 0.06 K/UL (ref 0–0.11)
IMM GRANULOCYTES NFR BLD AUTO: 0.7 % (ref 0–0.9)
LYMPHOCYTES # BLD AUTO: 1.4 K/UL (ref 1–4.8)
LYMPHOCYTES NFR BLD: 15.2 % (ref 22–41)
MCH RBC QN AUTO: 29.1 PG (ref 27–33)
MCHC RBC AUTO-ENTMCNC: 35.5 G/DL (ref 32.3–36.5)
MCV RBC AUTO: 81.9 FL (ref 81.4–97.8)
MONOCYTES # BLD AUTO: 0.8 K/UL (ref 0–0.85)
MONOCYTES NFR BLD AUTO: 8.7 % (ref 0–13.4)
NEUTROPHILS # BLD AUTO: 6.83 K/UL (ref 1.82–7.42)
NEUTROPHILS NFR BLD: 73.9 % (ref 44–72)
NRBC # BLD AUTO: 0 K/UL
NRBC BLD-RTO: 0 /100 WBC (ref 0–0.2)
PLATELET # BLD AUTO: 220 K/UL (ref 164–446)
PMV BLD AUTO: 9.8 FL (ref 9–12.9)
POTASSIUM SERPL-SCNC: 3.5 MMOL/L (ref 3.6–5.5)
RBC # BLD AUTO: 5.26 M/UL (ref 4.7–6.1)
SODIUM SERPL-SCNC: 137 MMOL/L (ref 135–145)
WBC # BLD AUTO: 9.2 K/UL (ref 4.8–10.8)

## 2024-02-27 PROCEDURE — 700101 HCHG RX REV CODE 250: Performed by: NURSE PRACTITIONER

## 2024-02-27 PROCEDURE — 85025 COMPLETE CBC W/AUTO DIFF WBC: CPT

## 2024-02-27 PROCEDURE — A9270 NON-COVERED ITEM OR SERVICE: HCPCS | Performed by: SURGERY

## 2024-02-27 PROCEDURE — 80048 BASIC METABOLIC PNL TOTAL CA: CPT

## 2024-02-27 PROCEDURE — 36415 COLL VENOUS BLD VENIPUNCTURE: CPT

## 2024-02-27 PROCEDURE — RXMED WILLOW AMBULATORY MEDICATION CHARGE: Performed by: NURSE PRACTITIONER

## 2024-02-27 PROCEDURE — 700102 HCHG RX REV CODE 250 W/ 637 OVERRIDE(OP): Performed by: SURGERY

## 2024-02-27 PROCEDURE — 99239 HOSP IP/OBS DSCHRG MGMT >30: CPT | Performed by: NURSE PRACTITIONER

## 2024-02-27 RX ORDER — METHOCARBAMOL 750 MG/1
750 TABLET, FILM COATED ORAL EVERY 8 HOURS PRN
Qty: 32 TABLET | Refills: 0 | Status: SHIPPED | OUTPATIENT
Start: 2024-02-27 | End: 2024-03-12

## 2024-02-27 RX ORDER — GABAPENTIN 100 MG/1
100 CAPSULE ORAL EVERY 8 HOURS
Qty: 42 CAPSULE | Refills: 0 | Status: SHIPPED | OUTPATIENT
Start: 2024-02-27 | End: 2024-03-12

## 2024-02-27 RX ORDER — OXYCODONE HYDROCHLORIDE 5 MG/1
5 TABLET ORAL EVERY 4 HOURS PRN
Qty: 30 TABLET | Refills: 0 | Status: SHIPPED | OUTPATIENT
Start: 2024-02-27 | End: 2024-03-05

## 2024-02-27 RX ORDER — ENEMA 19; 7 G/133ML; G/133ML
1 ENEMA RECTAL ONCE
Status: COMPLETED | OUTPATIENT
Start: 2024-02-27 | End: 2024-02-27

## 2024-02-27 RX ADMIN — ACETAMINOPHEN 650 MG: 325 TABLET, FILM COATED ORAL at 12:03

## 2024-02-27 RX ADMIN — METAXALONE 800 MG: 800 TABLET ORAL at 12:04

## 2024-02-27 RX ADMIN — GABAPENTIN 100 MG: 100 CAPSULE ORAL at 14:58

## 2024-02-27 RX ADMIN — SODIUM PHOSPHATE 133 ML: 7; 19 ENEMA RECTAL at 13:00

## 2024-02-27 ASSESSMENT — PAIN DESCRIPTION - PAIN TYPE
TYPE: ACUTE PAIN
TYPE: ACUTE PAIN

## 2024-02-27 NOTE — CARE PLAN
Problem: Knowledge Deficit - Standard  Goal: Patient and family/care givers will demonstrate understanding of plan of care, disease process/condition, diagnostic tests and medications  Description: Target End Date:  1-3 days or as soon as patient condition allows    Document in Patient Education    1.  Patient and family/caregiver oriented to unit, equipment, visitation policy and means for communicating concern  2.  Complete/review Learning Assessment  3.  Assess knowledge level of disease process/condition, treatment plan, diagnostic tests and medications  4.  Explain disease process/condition, treatment plan, diagnostic tests and medications  Outcome: Progressing   The patient is Stable - Low risk of patient condition declining or worsening    Shift Goals  Clinical Goals: bowel movement  Patient Goals: bowel movement    Progress made toward(s) clinical / shift goals: POC discussed with the pt and family & verbalized understanding.

## 2024-02-27 NOTE — PROGRESS NOTES
Patient did have some relief after a bowel movement with fleets enema last night.  He does still feel a little distended and a little constipated.  Patient does live in [place] and will require someone to travel to [place] to pick him up.  They do have a ride this evening.  Patient is pending [facility] and does not have a PCP in [place]    -Repeat fleets enema this morning and okay to shower  -Patient and wife would like to inspect discharged this evening and will have friend pick them up.  -Provide patient with front wheel walker   -Medications will be sent to meds to beds  -Family patient aware of TLSo requirement    Anticipate discharge this evening

## 2024-02-27 NOTE — CARE PLAN
The patient is Stable - Low risk of patient condition declining or worsening    Shift Goals  Clinical Goals: Return of bowel function, increased mobility  Patient Goals: rest    Progress made toward(s) clinical / shift goals:  Patient complains of abdominal pain and distention. Suppository administered per order. Patient reports passing of flatus this afternoon. Patient sitting up in chair throughout day, worked with PT, and ambulated in hallway.    Patient is not progressing towards the following goals: N/A

## 2024-02-27 NOTE — DISCHARGE SUMMARY
Trauma Discharge Summary    DATE OF ADMISSION: 2/24/2024    DATE OF DISCHARGE: 2/27/2024    LENGTH OF STAY: 3 days    ATTENDING PHYSICIAN: Scar Ventura M.D.    CONSULTING PHYSICIAN:   1. Henrique Wick MD Neurosurgery      DISCHARGE DIAGNOSIS:  Principal Problem:    Trauma  Active Problems:    Burst fracture of twelfth thoracic vertebra (HCC)    No contraindication to deep vein thrombosis (DVT) prophylaxis    Ileus (HCC)  Resolved Problems:    * No resolved hospital problems. *      PROCEDURES:  1.   2.     HISTORY OF PRESENT ILLNESS: The patient is a 48 y.o. male who was reportedly injured in a sledding accident.  He was initially seen at [facility] and he  was transferred to [facility] in [place], [place].    HOSPITAL COURSE: The patient was triaged as a consult level transfer activation. The patient was transported to surgical trevizo where he was evaluated by neurosurgery and given a TLSO.  Patient was seen by therapies and started on a regular diet.  He did develop some abdominal distention which was relieved by suppository and fleets enema.  On the day of discharge he is tolerating regular diet, his pain has been controlled, he is returning back to the [place] area and will need to establish with neurosurgery back in [place].  Him and his wife both state understanding to TLSO instructions and restrictions regarding the TLSO    HOSPITAL PROBLEM LIST:  * Trauma- (present on admission)  Assessment & Plan  Sledding accident.  Trauma Green Transfer Activation from [facility] in [place], [place].  Scar Ventura MD, Trauma Surgery      Burst fracture of twelfth thoracic vertebra (HCC)- (present on admission)  Assessment & Plan  T12 burst fracture with 5 mm retropulsion without central canal stenosis.  Non-operative management.  TLSO when out of bed.  2/25 Upright films with stable loss of height  Henrique Wick MD. Neurosurgeon. [facility].     Ileus (HCC)-  (present on admission)  Assessment & Plan  2/26 Probable illus  -Better after suppository  2/27 Continues to improve.    No contraindication to deep vein thrombosis (DVT) prophylaxis- (present on admission)  Assessment & Plan  VTE prophylaxis initially contraindicated secondary to elevated bleeding risk.  2/25 Trauma surveillance venous duplex ultrasonography ordered.  2/25 Prophylactic dose enoxaparin 40 mg BID initiated.           DISPOSITION: Discharged home on 2/27/2024. The patient and family were counseled and questions were answered. Specifically, signs and symptoms of infection, respiratory decompensation, TLSO and persistent or worsening pain were discussed and the patient agrees to seek medical attention if any of these develop.    DISCHARGE MEDICATIONS:  The patients controlled substance history was reviewed and a controlled substance use informed consent (if applicable) was provided by [facility] and the patient has been prescribed.     Medication List        START taking these medications        Instructions   gabapentin 100 MG Caps  Commonly known as: Neurontin   Take 1 Capsule by mouth every 8 hours for 14 days.  Dose: 100 mg     methocarbamol 750 MG Tabs  Commonly known as: Robaxin   Take 1 Tablet by mouth every 8 hours as needed (muscle pain) for up to 14 days.  Dose: 750 mg     oxyCODONE immediate-release 5 MG Tabs  Commonly known as: Roxicodone   Take 1 Tablet by mouth every four hours as needed for Severe Pain for up to 7 days.  Dose: 5 mg              ACTIVITY:  TLSO when OOB for 12 weeks, ok to remove for showering.  No lifting more than 15 pounds for 6 weeks until he can f/u with spine surgery near his home, and likely will not be cleared to lift >15 pounds for 3 months while wearing brace.      WOUND CARE:      DIET:  Orders Placed This Encounter   Procedures    Diet Order Diet: Full Liquid     Standing Status:   Standing     Number of Occurrences:   1     Order Specific  Question:   Diet:     Answer:   Full Liquid [11]       FOLLOW UP:  Establish in [place] with a Spine provider    Schedule an appointment as soon as possible for a visit        TIME SPENT ON DISCHARGE: 32 minutes      ____________________________________________  RAJESH Courtney    DD: 2/27/2024 10:22 AM

## 2024-02-27 NOTE — DISCHARGE PLANNING
Case Management Discharge Planning    Admission Date: 2/24/2024  GMLOS: 2.9  ALOS: 3    6-Clicks ADL Score: 18  6-Clicks Mobility Score: 18      Anticipated Discharge Dispo: Discharge Disposition: Discharged to home/self care (01)    LMSW requested RN to follow up with walker from [employer].  Pt has discharge order in for today, 2/27.  Per APN, pt likely to discharge this evening with transport from a friend back to [place], [place].

## 2024-02-27 NOTE — PROGRESS NOTES
Received report of patient at start of shift. Patient is AOx4, family at bedside. Patient declines intervention for pain. Assessment complete. Patient ambulates with SBA and FWW, TLSO brace in use with ambulation. Patient updated on plan of care, encouraged to notify staff for any needs/assistance. Call light within reach.

## 2024-02-27 NOTE — DISCHARGE INSTRUCTIONS
TLSO when OOB for 12 weeks, ok to remove for showering.  No lifting more than 15 pounds for 6 weeks until he can f/u with spine surgery near his home, and likely will not be cleared to lift >15 pounds for 3 months while wearing brace.    - Call or seek medical attention for questions or concerns  - Follow up with primary care provider within one weeks time. Review your home medication with your primary care doctor.    - Resume regular diet  - May take over the counter acetaminophen. Avoid NSAIDS, Aspirin, Ibuprofen if you have been advise to do so by your Neurosurgeon for 2 weeks  - May use OTC 4% lidocaine patches if unable to fill Lidoderm patches  - Continue daily over the counter stool softener while on narcotics  - No operation of machinery or motorized vehicles while under the influence of narcotics  - No alcohol, marijuana or illicit drug use while under the influence of narcotics  - In the event of a narcotic overdose naloxone (Narcan) is available without a prescription from any [facility] or [facility]  - Hospital staff are unable to refill your pain medication. You will need to contact your PCP or surgeon's office for refills  - No swimming, hot tubs, baths or wound submersion until cleared by outpatient provider. May shower  - No contact sports, strenuous activities, or heavy lifting until cleared by outpatient provider  - If respiratory decompensation, persistent or worsening pain, fever or signs or symptoms of infection occur seek medical attention

## 2024-02-27 NOTE — PROGRESS NOTES
Neurosurgery Progress Note    Subjective:  48 y.o. male who presented 2024 with T12 burst fracture with mild kyphosis after a sledding accident  Patient denies neurologic symptoms, endorses back pain at the site of injury.  Had some nausea with vomiting x1 last night.  Voiding fine, passing flatus and had a small BM.  Mobilizing some.  Upright x-rays completed and look good/stable, and have been reviewed by Dr. Wick.     Exam:  Pleasant and cooperative  A&O x 4  B/L LE strength and sensation full and intact throughout      BP  Min: 116/77  Max: 142/90  Pulse  Av.3  Min: 74  Max: 88  Resp  Av  Min: 16  Max: 16  Temp  Av.6 °C (97.9 °F)  Min: 36.2 °C (97.2 °F)  Max: 36.8 °C (98.2 °F)  SpO2  Av.8 %  Min: 94 %  Max: 96 %    No data recorded    Recent Labs     24  1132 24  0816 24  0030   WBC 12.7* 11.7* 9.2   RBC 5.12 5.38 5.26   HEMOGLOBIN 14.9 15.6 15.3   HEMATOCRIT 42.4 43.8 43.1   MCV 82.8 81.4 81.9   MCH 29.1 29.0 29.1   MCHC 35.1 35.6 35.5   RDW 38.9 37.7 37.6   PLATELETCT 227 231 220   MPV 9.8 9.6 9.8     Recent Labs     24  1132 24  0816 24  0030   SODIUM 140 140 137   POTASSIUM 3.7 3.7 3.5*   CHLORIDE 103 102 101   CO2 25 25 25   GLUCOSE 111* 122* 111*   BUN 19 20 17   CREATININE 0.92 0.82 0.68   CALCIUM 9.0 9.2 8.9     Recent Labs     24   APTT 26.9   INR 1.05           Intake/Output                         24 0700 - 24 0659 24 07 - 24 0659     0923-0544 1629-2396 Total 0688-7511 2603-5700 Total                 Intake    P.O.  420  -- 420  --  -- --    P.O. 420 -- 420 -- -- --    Total Intake 420 -- 420 -- -- --       Output    Urine  --  -- --  --  -- --    Number of Times Voided -- -- -- 1 x -- 1 x    Emesis  --  -- --  0  -- 0    Emesis -- -- -- 0 -- 0    Stool  --  -- --  --  -- --    Number of Times Stooled -- 2 x 2 x 1 x -- 1 x    Total Output -- -- -- 0 -- 0       Net I/O     420 -- 420 0 -- 0               Intake/Output Summary (Last 24 hours) at 2/27/2024 0956  Last data filed at 2/27/2024 0843  Gross per 24 hour   Intake 420 ml   Output 0 ml   Net 420 ml               enoxaparin (LOVENOX) injection  30 mg Q12HRS    Respiratory Therapy Consult   Continuous RT    Pharmacy Consult Request  1 Each PHARMACY TO DOSE    ondansetron  4 mg Q4HRS PRN    ondansetron  4 mg Q4HRS PRN    docusate sodium  100 mg BID    senna-docusate  1 Tablet Nightly    senna-docusate  1 Tablet Q24HRS PRN    polyethylene glycol/lytes  1 Packet BID    magnesium hydroxide  30 mL DAILY    bisacodyl  10 mg Q24HRS PRN    acetaminophen  650 mg Q6HRS    Followed by    [START ON 3/1/2024] acetaminophen  650 mg Q6HRS PRN    oxyCODONE immediate-release  5 mg Q3HRS PRN    Or    oxyCODONE immediate-release  10 mg Q3HRS PRN    Or    HYDROmorphone  0.5 mg Q3HRS PRN    gabapentin  100 mg Q8HRS    metaxalone  800 mg TID    labetalol  10 mg Q4HRS PRN       Assessment and Plan:  Hospital day # 4  TLSO when OOB for 12 weeks, ok to remove for showering.  No lifting more than 15 pounds for 6 weeks until he can f/u with spine surgery near his home, and likely will not be cleared to lift >15 pounds for 3 months while wearing brace.    Ok to d/c home per NS when ok with trauma.   May reconsult NS as needed.      Chemical prophylactic DVT therapy: Yes - Lovenox 40mg/qd    Start date/time: 2/25

## 2024-02-27 NOTE — PROGRESS NOTES
Notified STEVEN Ruiz of results of abdominal X-ray from today. Received orders to give Dulcolax suppository x1 now. If suppository ineffective after one hour, fleet enema to be given. Scheduled colace and Miralax to be held until patient has bowel movement. Also received orders to change diet to full liquid.

## 2024-02-28 NOTE — PROGRESS NOTES
Patient wheeled down to [facility] parking in wheelchair by care aide. Patient's family member to provide transportation home. Patient left with all belongings and medications.

## 2024-02-28 NOTE — PROGRESS NOTES
Received report of patient at start of shift. Patient is AOx4, sister at bedside. Scheduled medications administered for complaints of mild pain. Assessment complete. Fleet enema administered per order. Patient denies having BM after enema. Patient reports passing gas, states he is tolerating small amount of meals. Discharge orders received. Patient updated on plan of care/discharge plan, encouraged to notify staff for any needs/assistance. Call light within reach.

## 2024-02-28 NOTE — CARE PLAN
The patient is Stable - Low risk of patient condition declining or worsening    Shift Goals  Clinical Goals: Discharge home  Patient Goals: Discharge home    Progress made toward(s) clinical / shift goals:  Discharge orders received. Patient states family member will arrive to provide transportation home between 1900 and 2000 tonight.     Patient is not progressing towards the following goals: N/A

## 2024-02-28 NOTE — PROGRESS NOTES
FWW was unable to be delivered to patient as they were already discharged home and not at [facility] Floor when traction order was received.

## 2024-02-28 NOTE — PROGRESS NOTES
Discharge instructions provided to patient. AVS reviewed and signed. Patient states family is delayed and will arrive at hospital around 2100 to provide transportation home. Discharge questions addressed. Meds to beds delivered. PIV removed.    Ok to send prescription for Sulfasalazine IR instead of delayed release.  Please order for 1 year refills.